# Patient Record
Sex: FEMALE | Race: WHITE | Employment: PART TIME | URBAN - METROPOLITAN AREA
[De-identification: names, ages, dates, MRNs, and addresses within clinical notes are randomized per-mention and may not be internally consistent; named-entity substitution may affect disease eponyms.]

---

## 2020-09-03 ENCOUNTER — OFFICE VISIT (OUTPATIENT)
Dept: OBGYN CLINIC | Age: 25
End: 2020-09-03
Payer: COMMERCIAL

## 2020-09-03 ENCOUNTER — HOSPITAL ENCOUNTER (OUTPATIENT)
Age: 25
Setting detail: SPECIMEN
Discharge: HOME | End: 2020-09-03
Payer: COMMERCIAL

## 2020-09-03 VITALS
BODY MASS INDEX: 19.25 KG/M2 | SYSTOLIC BLOOD PRESSURE: 90 MMHG | HEIGHT: 66 IN | DIASTOLIC BLOOD PRESSURE: 66 MMHG | WEIGHT: 119.8 LBS

## 2020-09-03 LAB
ABO/RH: NORMAL
ABSOLUTE EOS #: 0.07 K/UL (ref 0–0.44)
ABSOLUTE IMMATURE GRANULOCYTE: <0.03 K/UL (ref 0–0.3)
ABSOLUTE LYMPH #: 2.04 K/UL (ref 1.1–3.7)
ABSOLUTE MONO #: 0.53 K/UL (ref 0.1–1.2)
AMPHETAMINE SCREEN URINE: NEGATIVE
ANTIBODY SCREEN: NEGATIVE
BARBITURATE SCREEN URINE: NEGATIVE
BASOPHILS # BLD: 0 % (ref 0–2)
BASOPHILS ABSOLUTE: <0.03 K/UL (ref 0–0.2)
BENZODIAZEPINE SCREEN, URINE: NEGATIVE
BILIRUBIN URINE: NEGATIVE
BUPRENORPHINE URINE: NORMAL
C. TRACHOMATIS, EXTERNAL RESULT: NEGATIVE
CANNABINOID SCREEN URINE: NEGATIVE
COCAINE METABOLITE, URINE: NEGATIVE
COLOR: YELLOW
COMMENT UA: NORMAL
CONTROL: PRESENT
DIFFERENTIAL TYPE: ABNORMAL
DIRECT EXAM: ABNORMAL
EOSINOPHILS RELATIVE PERCENT: 1 % (ref 1–4)
GLUCOSE URINE: NEGATIVE
HCT VFR BLD CALC: 40.4 % (ref 36.3–47.1)
HEMOGLOBIN: 12.9 G/DL (ref 11.9–15.1)
HEPATITIS B SURFACE ANTIGEN: NONREACTIVE
HEPATITIS C ANTIBODY: NONREACTIVE
HIV AG/AB: NONREACTIVE
IMMATURE GRANULOCYTES: 0 %
KETONES, URINE: NEGATIVE
LEUKOCYTE ESTERASE, URINE: NEGATIVE
LYMPHOCYTES # BLD: 24 % (ref 24–43)
Lab: ABNORMAL
MCH RBC QN AUTO: 29.7 PG (ref 25.2–33.5)
MCHC RBC AUTO-ENTMCNC: 31.9 G/DL (ref 28.4–34.8)
MCV RBC AUTO: 92.9 FL (ref 82.6–102.9)
MDMA URINE: NORMAL
METHADONE SCREEN, URINE: NEGATIVE
METHAMPHETAMINE, URINE: NORMAL
MONOCYTES # BLD: 6 % (ref 3–12)
N. GONORRHOEAE, EXTERNAL RESULT: NEGATIVE
NITRITE, URINE: NEGATIVE
NRBC AUTOMATED: 0 PER 100 WBC
OPIATES, URINE: NEGATIVE
OXYCODONE SCREEN URINE: NEGATIVE
PDW BLD-RTO: 12 % (ref 11.8–14.4)
PH UA: 7.5 (ref 5–8)
PHENCYCLIDINE, URINE: NEGATIVE
PLATELET # BLD: 247 K/UL (ref 138–453)
PLATELET ESTIMATE: ABNORMAL
PMV BLD AUTO: 10.2 FL (ref 8.1–13.5)
PREGNANCY TEST URINE, POC: POSITIVE
PROPOXYPHENE, URINE: NORMAL
PROTEIN UA: NEGATIVE
RBC # BLD: 4.35 M/UL (ref 3.95–5.11)
RBC # BLD: ABNORMAL 10*6/UL
RUBV IGG SER QL: 267.1 IU/ML
SEG NEUTROPHILS: 69 % (ref 36–65)
SEGMENTED NEUTROPHILS ABSOLUTE COUNT: 5.79 K/UL (ref 1.5–8.1)
SPECIFIC GRAVITY UA: 1.02 (ref 1–1.03)
SPECIMEN DESCRIPTION: ABNORMAL
T. PALLIDUM, IGG: NONREACTIVE
TEST INFORMATION: NORMAL
TRICYCLIC ANTIDEPRESSANTS, UR: NORMAL
TURBIDITY: CLEAR
URINE HGB: NEGATIVE
UROBILINOGEN, URINE: NORMAL
WBC # BLD: 8.5 K/UL (ref 3.5–11.3)
WBC # BLD: ABNORMAL 10*3/UL

## 2020-09-03 PROCEDURE — 81025 URINE PREGNANCY TEST: CPT | Performed by: NURSE PRACTITIONER

## 2020-09-03 PROCEDURE — 99203 OFFICE O/P NEW LOW 30 MIN: CPT | Performed by: NURSE PRACTITIONER

## 2020-09-03 RX ORDER — VITAMIN A ACETATE, .BETA.-CAROTENE, ASCORBIC ACID, CHOLECALCIFEROL, .ALPHA.-TOCOPHEROL ACETATE, DL-, THIAMINE MONONITRATE, RIBOFLAVIN, NIACINAMIDE, PYRIDOXINE HYDROCHLORIDE, FOLIC ACID, CYANOCOBALAMIN, CALCIUM CARBONATE, FERROUS FUMARATE, ZINC OXIDE, AND CUPRIC OXIDE 2000; 2000; 120; 400; 22; 1.84; 3; 20; 10; 1; 12; 200; 27; 25; 2 [IU]/1; [IU]/1; MG/1; [IU]/1; MG/1; MG/1; MG/1; MG/1; MG/1; MG/1; UG/1; MG/1; MG/1; MG/1; MG/1
TABLET ORAL
COMMUNITY
Start: 2020-08-09

## 2020-09-03 ASSESSMENT — ENCOUNTER SYMPTOMS
SHORTNESS OF BREATH: 0
DIARRHEA: 0
CONSTIPATION: 0
COUGH: 0
BACK PAIN: 0
ABDOMINAL DISTENTION: 0
ABDOMINAL PAIN: 0

## 2020-09-03 NOTE — PROGRESS NOTES
Talita Munguia is a 30 y.o.  at 13w2d with Estimated Date of Delivery: 3/14/21 who presents for prenatal care. This is a planned pregnancy : No  Patient's last menstrual period was 2020. 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 Patientdoes intend to breast feed. Pregnancy history fully reviewed. Mother's ethnicity:   Father's ethnicity:          POB:   OB History    Para Term  AB Living   1             SAB TAB Ectopic Molar Multiple Live Births                    # Outcome Date GA Lbr Ramses/2nd Weight Sex Delivery Anes PTL Lv   1 Current              Complications from previous pregnancies/deliveries    PGYN: denies STDs; denies abnormal pap smears                      Menses regular yes  Contraception was on OCP's (was out for a week) took x 1 month    PMH:  has a past medical history of Anxiety. PSH:  has a past surgical history that includes [place] tooth extraction. FH:family history is not on file. SH: denies X 3, family supportive  reports that she has never smoked. She does not have any smokeless tobacco history on file. She reports current alcohol use. She reports that she does not use drugs. Review of Systems   Constitutional: Negative for appetite change and fatigue. HENT: Negative for congestion and hearing loss. Eyes: Negative for visual disturbance. Respiratory: Negative for cough and shortness of breath. Cardiovascular: Negative for chest pain and palpitations. Gastrointestinal: Negative for abdominal distention, abdominal pain, constipation and diarrhea. Genitourinary: Negative for flank pain, frequency, menstrual problem, pelvic pain and vaginal discharge. Musculoskeletal: Negative for back pain. Neurological: Negative for syncope and headaches. Psychiatric/Behavioral: Negative for behavioral problems. Physical exam:Ht 5' 6\" (1.676 m)   LMP 2020   Breastfeeding No   BMI 19.82 kg/m²   General Appearance: alert and oriented to person,place and time, well developed and well- nourished, in no acute distress  Skin: warm and dry, no rash or erythema  Head: normocephalic and atraumatic  Eyes: extraocular eye movements intact, conjunctivae normal  ENT:  external ear and ear canal normal bilaterally, nose without deformity, nasal mucosa normal   Neck: supple and non-tender without mass, no thyromegaly or thyroid nodules, no cervical lymphadenopathy  Pulmonary/Chest: clear to auscultation bilaterally- no wheezes, rales or rhonchi, normal air movement, no respiratory distress  Cardiovascular: normal rate, regular rhythm, normal S1 and S2, no murmurs, rubs, clicks, orgallops, distal pulses intact, no carotid bruits  Abdomen: soft, non-tender, non-distended, normal bowel sounds, no masses or organomegaly  Extremities: no cyanosis, clubbing or edema  Musculoskeletal: normal rangeof motion, no joint swelling, deformity or tenderness  Neurologic: reflexes normal and symmetric, no cranial nerve deficit, gait, coordination and speech normal  Breast: without skin retraction, dimpling, puckering, nipple discharge or masses. There is no axillary adenopathy      Pelvic: external genitalia WNL's, no rashes, no lesions. Speculum exam: vaginal vault pink, wellrugated, without lesions. No discharge. Cervix without lesions. Bimanual exam: no cervical motion tenderness. Impression: @ 12w4d by LMP             There is no problem list on file for this patient. Plan:    -Papand routine cultures to lab.   -Options for genetic testing : discussed- doesn't think she wants to do it.   -Return to clinic 2 weeks for Ultrasound and ACOG appointments.  -Prenatal vitamins          Orders Placed This Encounter   Procedures    C.trachomatis N.gonorrhoeae DNA    Culture, Urine    VAGINITIS DNA PROBE    US OB LESS THAN 14 WEEKS SINGLE OR FIRST GESTATION    US OB TRANSVAGINAL    CBC Auto Differential    Cystic fibrosis gene test    Hepatitis B Surface Antigen Obstetric Panel    Hepatitis C Antibody    HIV Screen    PAP SMEAR    Rubella antibody, IgG    T. pallidum Ab    Urinalysis    Urine Drug Screen    POCT urine pregnancy    Prenatal type and screen

## 2020-09-03 NOTE — PATIENT INSTRUCTIONS
Patient Education        Weeks 10 to 14 of Your Pregnancy: Care Instructions  Your Care Instructions     By weeks 10 to 14 of your pregnancy, the placenta has formed inside your uterus. It is possible to hear your baby's heartbeat with a special ultrasound device. Your baby's eyes can and do move. The arms and legs can bend. This is a good time to think about testing for birth defects. There are two types of tests: screening and diagnostic. Screening tests show the chance that a baby has a certain birth defect. They can't tell you for sure that your baby has a problem. Diagnostic tests show if a baby has a certain birth defect. It's your choice whether to have these tests. You and your partner can talk to your doctor or midwife about birth defects tests. Follow-up care is a key part of your treatment and safety. Be sure to make and go to all appointments, and call your doctor if you are having problems. It's also a good idea to know your test results and keep a list of the medicines you take. How can you care for yourself at home? Decide about tests  · You can have screening tests and diagnostic tests to check for birth defects. The decision to have a test for birth defects is personal. Think about your age, your chance of passing on a family disease, your need to know about any problems, and what you might do after you have the test results. ? Triple or quadruple (quad) blood tests. These screening tests can be done between 15 and 20 weeks of pregnancy. They check the amounts of three or four substances in your blood. The doctor looks at these test results, along with your age and other factors, to find out the chance that your baby may have certain problems. ? Amniocentesis. This diagnostic test is used to look for chromosomal problems in the baby's cells.  It can be done between 15 and 20 weeks of pregnancy, usually around week 16.  ? Nuchal translucency test. This test uses ultrasound to measure the thickness of the area at the back of the baby's neck. An increase in the thickness can be an early sign of Down syndrome. ? Chorionic villus sampling (CVS). This is a test that looks for certain genetic problems with your baby. The same genes that are in your baby are in the placenta. A small piece of the placenta is taken out and tested. This test is done when you are 10 to 13 weeks pregnant. Ease discomfort  · Slow down and take naps when you feel tired. · If your emotions swing, talk to someone. Crying, anxiety, and concentration problems are common. · If your gums bleed, try a softer toothbrush. If your gums are puffy and bleed a lot, see your dentist.  · If you feel dizzy:  ? Get up slowly after sitting or lying down. ? Drink plenty of fluids. ? Eat small snacks to keep your blood sugar stable. ? Put your head between your legs as though you were tying your shoelaces. ? Lie down with your legs higher than your head. Use pillows to prop up your feet. · If you have a headache:  ? Lie down. ? Ask your partner or a good friend for a neck massage. ? Try cool cloths over your forehead or across the back of your neck. ? Use acetaminophen (Tylenol) for pain relief. Do not use nonsteroidal anti-inflammatory drugs (NSAIDs), such as ibuprofen (Advil, Motrin) or naproxen (Aleve), unless your doctor says it is okay. · If you have a nosebleed, pinch your nose gently, and hold it for a short while. To prevent nosebleeds, try massaging a small dab of petroleum jelly, such as Vaseline, in your nostrils. · If your nose is stuffed up, try saline (saltwater) nose sprays. Do not use decongestant sprays. Care for your breasts  · Wear a bra that gives you good support. · Know that changes in your breasts are normal.  ? Your breasts may get larger and more tender. Tenderness usually gets better by 12 weeks. ? Your nipples may get darker and larger, and small bumps around your nipples may show more. ?  The veins in your chest and breasts may show more. · Don't worry about \"toughening'\" your nipples. Breastfeeding will naturally do this. Where can you learn more? Go to https://[facility].healthSpreedly. org and sign in to your [employer] account. Enter K945 in the [facility] box to learn more about \"Weeks 10 to 14 of Your Pregnancy: Care Instructions. \"     If you do not have an account, please click on the \"Sign Up Now\" link. Current as of: February 11, 2020               Content Version: 12.5  © 1178-6761 [employer]. Care instructions adapted under license by [facility] ([facility]). If you have questions about a medical condition or this instruction, always ask your healthcare professional. [place] [address] any warranty or liability for your use of this information. Patient Education        Learning About When to Call Your Doctor During Pregnancy (Up to 20 Weeks)  Your Care Instructions     It's common to have concerns about what might be a problem during pregnancy. Although most pregnant women don't have any serious problems, it's important to know when to call your doctor if you have certain symptoms. These are general suggestions. Your doctor may give you some more information about when to call. When to call your doctor (up to 20 weeks)  [id number] anytime you think you may need emergency care. For example, call if:  · You passed out (lost consciousness). Call your doctor now or seek immediate medical care if:  · You have a fever. · You have vaginal bleeding. · You are dizzy or lightheaded, or you feel like you may faint. · You have symptoms of a urinary tract infection. These may include:  ? Pain or burning when you urinate. ? A frequent need to urinate without being able to pass much urine. ? Pain in the flank, which is just below the rib cage and above the waist on either side of the back. ? Blood in your urine. · You have belly pain.   · You think you are having

## 2020-09-04 LAB
C TRACH DNA GENITAL QL NAA+PROBE: NEGATIVE
CULTURE: NO GROWTH
Lab: NORMAL
N. GONORRHOEAE DNA: NEGATIVE
SPECIMEN DESCRIPTION: NORMAL
SPECIMEN DESCRIPTION: NORMAL

## 2020-09-04 RX ORDER — METRONIDAZOLE 7.5 MG/G
1 GEL VAGINAL NIGHTLY
Qty: 1 TUBE | Refills: 0 | Status: SHIPPED | OUTPATIENT
Start: 2020-09-04 | End: 2020-09-11

## 2020-09-10 LAB — CYSTIC FIBROSIS: NORMAL

## 2020-09-14 LAB — CYTOLOGY REPORT: NORMAL

## 2020-09-18 ENCOUNTER — INITIAL PRENATAL (OUTPATIENT)
Dept: OBGYN CLINIC | Age: 25
End: 2020-09-18

## 2020-09-18 ENCOUNTER — PROCEDURE VISIT (OUTPATIENT)
Dept: OBGYN CLINIC | Age: 25
End: 2020-09-18
Payer: COMMERCIAL

## 2020-09-18 VITALS — SYSTOLIC BLOOD PRESSURE: 110 MMHG | DIASTOLIC BLOOD PRESSURE: 70 MMHG | WEIGHT: 120.4 LBS | BODY MASS INDEX: 19.43 KG/M2

## 2020-09-18 LAB
CRL: NORMAL
SAC DIAMETER: NORMAL

## 2020-09-18 PROCEDURE — 0502F SUBSEQUENT PRENATAL CARE: CPT | Performed by: NURSE PRACTITIONER

## 2020-09-18 PROCEDURE — 76801 OB US < 14 WKS SINGLE FETUS: CPT | Performed by: OBSTETRICS & GYNECOLOGY

## 2020-09-18 NOTE — PATIENT INSTRUCTIONS

## 2020-09-18 NOTE — PROGRESS NOTES
-LOF, -VB  There is no problem list on file for this patient. Blood pressure 110/70, weight 120 lb 6.4 oz (54.6 kg), last menstrual period 2020, not currently breastfeeding. Giacomo Ennis is a 30 y.o. , here for her ACOG. The patients past medical, surgical, social and family history were reviewed. Current medications and allergies were reviewed, and documented in the chart. Menstrual history: monthly  Birth control: none    Wt Readings from Last 3 Encounters:   20 120 lb 6.4 oz (54.6 kg)   20 119 lb 12.8 oz (54.3 kg)   17 122 lb 12.8 oz (55.7 kg)     Recent Results (from the past 8736 hour(s))   C. Trachomatis, External Result    Collection Time: 20 12:00 AM   Result Value Ref Range    C. Trachomatis, External Result NEGATIVE    N. Gonorrhoeae, External Result    Collection Time: 20 12:00 AM   Result Value Ref Range    N. Gonorrhoeae, External Result NEGATIVE    Cystic Fibrosis    Collection Time: 20 12:00 AM   Result Value Ref Range    Cystic Fibrosis       Specimen(s) Received:  Peripheral blood, CF  Clinical Information:  Prenatal screening for CF  Unknown family history of CF      RESULTS:  ** This patient is negative for all CF mutations analyzed **  Due to the complex nature of this testing, genetic counseling is  recommended  This interpretation is based on the assumption that this individual  has a negative personal and family history for cystic fibrosis    INTERPRETATION:    Using the methods described, this patient tested  negative for all 60 mutations screened, including those recommended by  the [employer] of Obstetricians and Gynecologists and the  [employer].   These results do not rule out  the possibility that this individual could carry a CF mutation not  detected by this test.  The patient should understand that DNA studies  do not constitute a definitive carrier test for cystic fibrosis in all  individuals. Thus, interpretation is given as a probability (see  below). Accurate risk calcula tion requires accurate family history  information. Inaccurate reporting of a family history of cystic  fibrosis will lead to errors in residual risk assessment. It should  be realized that there are many sources of diagnostic error in  molecular testing which may include trace contamination of PCR  reactions, rare genetic variants that can interfere with the analysis,  or general laboratory error. Cystic Fibrosis Carrier Rate by Racial and Ethnic Group, Before and  After Screening  [from Glenna et al (2001):  [place] in Med 3(2), 149-154]                                  Estimated Carrier Risk          Ethnic Group               Detection Rate                     Before Test          After Negative Test       Ashkenazi Mandaeism                    97%               1/29 1/930                       90%               1/29 1/240                      69%               1/65 1/207       * American                 57%               1/46 1/105   American                    No data available          1/90           No data available     *Additional information required to accurately predict risk  Note:  Residual carrier risk after negative test is modified by  presence of positive family history of CF or by admixture of ethnic  groups. For these situations, accurate risk assessment requires Bayesian  analysis and genetic counseling    This molecular test has been approved for in vitro diagnostic use by  the U.S. FDA. This test is used for clinical purposes. Pursuant to  the requirements of CLIA '88, this laboratory has established and  verified the test's accuracy and precision. It should not be regarded  as investigational or for research.   This laboratory is certified  under the Clinical Laboratory Improvement Amendments of 1988 (CLIA  '88) as qualified to perform high complexity clinical laboratory  testing. Methodology:  Samples are tested for the presence of  wild type or  mutant gene sequences in the CFTR gene by the [employer] Cystic  Fibrosis 60 Kit. It is comprised of a single multiplex PCR reaction  that is then used in two separate Allele Specific Primer Extension  reactions, which are followed by two separate bead hybridization  reactions. The [employer] Assay screens for 60 CFTR mutations,  including the standard 23 mutation core panel recommended by the  [employer] of Obstetricians and Gynecologists (ACOG) and the  [address] ([facility]):  , , G542X,  G85E, R117H, 621+1G>T, 711+1G>T, N9058M, R334W, R347P, A455E,  1717-1G>A, R560T, R553X, G551D, 1898+1G>A, 2184delA, 2789+5G>A,  3120+1G>A, H2312A, 3659delC, 3849+10kbC>T, N7292N, 1078delT, 394delTT,  Y122X, R347H, V520F, A559T, S549N, S549R, 1898+5G>T, 2183AA>G,  2307insA, D2261Z, J4071L, X2782U, 3876delA, 3905insT, CFTRdele2,3,  E60X, R75X, 406-1G>A, G178R, [id number], L206W, 935delA, G330X, Q493X,  Q890X, 1677delTA, 4017gje8>A, 2143delT, K710X,  V2910F, 5111rms8,  U3550K, [id number], D8353S, 3791delC and variants 5T/7T/9T, F508C, I507V,  I506V. For samples positive for R117H, the IVS-8 Poly T variant is  analyzed. **Electronically Signed Out**   Amor Villalta M.D.         [address], [address]   Tel [phone]  [address] for Maryam Angel MD,   Hillary Thompson MD     POCT urine pregnancy    Collection Time: 09/03/20  8:37 AM   Result Value Ref Range    Preg Test, Ur POSITIVE     Control present    Hepatitis B Surface Antigen Obstetric Panel    Collection Time: 09/03/20  8:55 AM   Result Value Ref Range    Hepatitis B Surface Ag NONREACTIVE NONREACTIVE   Hepatitis C Antibody    Collection Time: 09/03/20  8:55 AM   Result Value Ref Range    Hepatitis C Ab NONREACTIVE NONREACTIVE   HIV Screen    Collection Time: 09/03/20  8:55 AM   Result Value Ref Range    HIV Ag/Ab NONREACTIVE NONREACTIVE   PRENATAL TYPE AND SCREEN    Collection Time: 09/03/20  8:55 AM   Result Value Ref Range    ABO/Rh B POSITIVE     Antibody Screen NEGATIVE    Rubella antibody, IgG    Collection Time: 09/03/20  8:55 AM   Result Value Ref Range    Rubella Antibody, .1 IU/mL   T. pallidum Ab    Collection Time: 09/03/20  8:55 AM   Result Value Ref Range    T. pallidum, IgG NONREACTIVE NONREACTIVE   Urinalysis    Collection Time: 09/03/20  8:55 AM   Result Value Ref Range    Color, UA YELLOW YELLOW    Turbidity UA CLEAR CLEAR    Glucose, Ur NEGATIVE NEGATIVE    Bilirubin Urine NEGATIVE NEGATIVE    Ketones, Urine NEGATIVE NEGATIVE    Specific Gravity, UA 1.019 1.005 - 1.030    Urine Hgb NEGATIVE NEGATIVE    pH, UA 7.5 5.0 - 8.0    Protein, UA NEGATIVE NEGATIVE    Urobilinogen, Urine Normal Normal    Nitrite, Urine NEGATIVE NEGATIVE    Leukocyte Esterase, Urine NEGATIVE NEGATIVE    Urinalysis Comments       Microscopic exam not performed based on chemical results unless requested in original order.    Urine Drug Screen    Collection Time: 09/03/20  8:55 AM   Result Value Ref Range    Amphetamine Screen, Ur NEGATIVE NEGATIVE    Barbiturate Screen, Ur NEGATIVE NEGATIVE    Benzodiazepine Screen, Urine NEGATIVE NEGATIVE    Cocaine Metabolite, Urine NEGATIVE NEGATIVE    Methadone Screen, Urine NEGATIVE NEGATIVE    Opiates, Urine NEGATIVE NEGATIVE    Phencyclidine, Urine NEGATIVE NEGATIVE    Propoxyphene, Urine NOT REPORTED NEGATIVE    Cannabinoid Scrn, Ur NEGATIVE NEGATIVE    Oxycodone Screen, Ur NEGATIVE NEGATIVE    Methamphetamine, Urine NOT REPORTED NEGATIVE    Tricyclic Antidepressants, Urine NOT REPORTED NEGATIVE    MDMA, Urine NOT REPORTED NEGATIVE    Buprenorphine Urine NOT REPORTED NEGATIVE    Test Information       Assay provides medical screening only. The absence of expected drug(s) and/or metabolite(s) may indicate diluted or adulterated urine, limitations of testing or timing of collection. CBC Auto Differential    Collection Time: 09/03/20  8:55 AM   Result Value Ref Range    WBC 8.5 3.5 - 11.3 k/uL    RBC 4.35 3.95 - 5.11 m/uL    Hemoglobin 12.9 11.9 - 15.1 g/dL    Hematocrit 40.4 36.3 - 47.1 %    MCV 92.9 82.6 - 102.9 fL    MCH 29.7 25.2 - 33.5 pg    MCHC 31.9 28.4 - 34.8 g/dL    RDW 12.0 11.8 - 14.4 %    Platelets 519 292 - 725 k/uL    MPV 10.2 8.1 - 13.5 fL    NRBC Automated 0.0 0.0 per 100 WBC    Differential Type NOT REPORTED     Seg Neutrophils 69 (H) 36 - 65 %    Lymphocytes 24 24 - 43 %    Monocytes 6 3 - 12 %    Eosinophils % 1 1 - 4 %    Basophils 0 0 - 2 %    Immature Granulocytes 0 0 %    Segs Absolute 5.79 1.50 - 8.10 k/uL    Absolute Lymph # 2.04 1.10 - 3.70 k/uL    Absolute Mono # 0.53 0.10 - 1.20 k/uL    Absolute Eos # 0.07 0.00 - 0.44 k/uL    Basophils Absolute <0.03 0.00 - 0.20 k/uL    Absolute Immature Granulocyte <0.03 0.00 - 0.30 k/uL    WBC Morphology NOT REPORTED     RBC Morphology NOT REPORTED     Platelet Estimate NOT REPORTED    Culture, Urine    Collection Time: 09/03/20  9:03 AM    Specimen: Urine, clean catch   Result Value Ref Range    Specimen Description . CLEAN CATCH URINE     Special Requests NOT REPORTED     Culture NO GROWTH    GYN Cytology    Collection Time: 09/03/20  1:46 PM   Result Value Ref Range    Cytology Report       INTERPRETATION    Cervical material, (ThinPrep vial, Imaging-assisted review):  Specimen Adequacy:       Satisfactory for evaluation.       -Endocervical/transformation zone component is absent. Descriptive Diagnosis:       Negative for intraepithelial lesion or malignancy.           Cytotechnologist:   SHARIF Ojeda(ASCP)  **Electronically Signed Out**  luther/9/14/2020            Source:  1: Cervical material, (ThinPrep vial, Imaging-assisted review)    Clinical History  Pregnant  High risk HPV DNA testing is requested if the diagnosis is abnormal    GYNECOLOGIC CYTOLOGY REPORT    Patient Name: Maribell Weaver [facility] Rec: [id number]  Path Number: [id number]  [address] [place] [address], [facility] [address] [place], [address]  [phone]  Fax: [phone]     C.trachomatis N.gonorrhoeae DNA    Collection Time: 09/03/20  5:14 PM    Specimen: Cervix   Result Value Ref Range    Specimen Description . CERVIX     C. trachomatis DNA NEGATIVE NEGATIVE    N. gonorrhoeae DNA NEGATIVE NEGATIVE   VAGINITIS DNA PROBE    Collection Time: 09/03/20  5:14 PM    Specimen: Vaginal   Result Value Ref Range    Specimen Description . VAGINA     Special Requests NOT REPORTED     Direct Exam POSITIVE for Gardnerella vaginalis. (A)     Direct Exam NEGATIVE for Candida sp. Direct Exam NEGATIVE for Trichomonas vaginalis     Direct Exam       Method of testing is a DNA probe intended for detection and identification of Candida species, Gardnerella vaginalis, and Trichomonas vaginalis nucleic acid in vaginal fluid specimens from patients with symptoms of vaginitis/vaginosis.    US OB LESS THAN 14 WEEKS SINGLE OR FIRST GESTATION    Collection Time: 09/18/20 12:00 AM   Result Value Ref Range    CRL      Sac Diameter         Past Medical History:   Diagnosis Date    Anxiety                                                                    Past Surgical History:   Procedure Laterality Date    WISDOM TOOTH EXTRACTION       Family History   Problem Relation Age of Onset    No Known Problems Mother     No Known Problems Father     Heart Disease Maternal Grandmother     Stroke Maternal Grandfather     No Known Problems Paternal Grandmother     No Known Problems Paternal Grandfather      Social History     Tobacco Use   Smoking Status Never Smoker   Smokeless Tobacco Never Used     Social History     Substance and Sexual Activity   Alcohol Use Not Currently    Comment: socially       MEDICATIONS:  Current Outpatient Medications   Medication Sig Dispense Refill    Prenatal Vit-Fe Fumarate-FA (PNV PRENATAL PLUS MULTIVITAMIN) 27-1 MG TABS TAKE 1 TABLET BY MOUTH ONE TIME A DAY      Doxylamine Succinate, Sleep, (UNISOM PO) Take by mouth      Pyridoxine HCl (VITAMIN B-6 PO) Take by mouth       No current facility-administered medications for this visit. ALLERGIES:  Patient has no known allergies. Reviewed global and practice OB care including nausea measures, nutrition, activities, warning signs, and contact information.  Offered cell free DNA screen,NT echo and WIC .    `--------------------------------------------------------------------------  Genetic Screening/Teratology Counseling  (Include patient, FOB or anyone in either family)    1) Patient's age 28 years or > at JANET: No  2) Thalassemia (Mediterranean, ): No  3) Neural Tube Defect:   No  4) Congenital heart defect:   No  5) Trisomy (e.g. Down Syndrome):  No  6) Ubaldo-sachs (Ev[employer], [facility]): No  7) Multiple Births:    No  8) Sickle cell (disease or trait):  No  9) Hemophilia or blood disorders:  No  10) Muscular Dystrophy:   No  11) Cystic Fibrosis:    No  12) Jose Ramon's chorea:   No  13) Mental retardation/Autism :  No   If yes, was person tested for fragile X: NA  14) Other inherited genetic/chromosomal disorder: No  15) Maternal metabolic disorder (DM, PKU): No  16) Child with birth defect not listed:  No  17) Recurrent pregnancy loss/stillbirth: No  18) Medications, supplements/illicit or   Recreational drugs/alcohol since LMP: No   List: Alcohol prior to positive pregnancy  19) Any other:   none    Comments/Counseling:   -------------------------------------------------------------------------  Infection History:    1) Live with someone with TB/exposed to TB: No  2) Patient/partner has h/o genital herpes: No  3) Rash/viral illness since LMP:  No  4) History of STD:    No  5) Other: NA  -------------------------------------------------------------------------

## 2020-10-16 ENCOUNTER — ROUTINE PRENATAL (OUTPATIENT)
Dept: OBGYN CLINIC | Age: 25
End: 2020-10-16

## 2020-10-16 ENCOUNTER — HOSPITAL ENCOUNTER (OUTPATIENT)
Age: 25
Setting detail: SPECIMEN
Discharge: HOME | End: 2020-10-16
Payer: COMMERCIAL

## 2020-10-16 VITALS — SYSTOLIC BLOOD PRESSURE: 94 MMHG | BODY MASS INDEX: 20.14 KG/M2 | WEIGHT: 124.8 LBS | DIASTOLIC BLOOD PRESSURE: 66 MMHG

## 2020-10-16 PROCEDURE — 0502F SUBSEQUENT PRENATAL CARE: CPT | Performed by: NURSE PRACTITIONER

## 2020-10-16 NOTE — PATIENT INSTRUCTIONS
Patient Education        Weeks 18 to 22 of Your Pregnancy: Care Instructions  Your Care Instructions     Your baby is continuing to develop quickly. At this stage, babies can now suck their thumbs,  firmly with their hands, and open and close their eyelids. Sometime between 18 and 22 weeks, you will start to feel your baby move. At first, these small fetal movements feel like fluttering or \"butterflies. \" Some women say that they feel like gas bubbles. As the baby grows, these movements will become stronger. You may also notice that your baby kicks and hiccups. During this time, you may find that your nausea and fatigue are gone. Overall, you may feel better and have more energy than you did in your first trimester. But you may also have new discomforts now, such as sleep problems or leg cramps. This care sheet can help you ease these discomforts. Follow-up care is a key part of your treatment and safety. Be sure to make and go to all appointments, and call your doctor if you are having problems. It's also a good idea to know your test results and keep a list of the medicines you take. How can you care for yourself at home? Ease sleep problems  · Avoid caffeine in drinks or chocolate late in the day. · Get some exercise every day. · Take a warm shower or bath before bed. · Have a light snack or glass of milk at bedtime. · Do relaxation exercises in bed to calm your mind and body. · Support your legs and back with extra pillows. Try a pillow between your legs if you sleep on your side. · Do not use sleeping pills or alcohol. They could harm your baby. Ease leg cramps  · Do not massage your calf during the cramp. · Sit on a firm bed or chair. Straighten your leg, and bend your foot (flex your ankle) slowly upward, toward your knee. Bend your toes up and down. · Stand on a cool, flat surface. Stretch your toes upward, and take small steps walking on your heels.   · Use a heating pad or hot water bottle to help with muscle ache. Prevent leg cramps  · Be sure to get enough calcium. If you are worried that you are not getting enough, talk to your doctor. · Exercise every day, and stretch your legs before bed. · Take a warm bath before bed, and try leg warmers at night. Where can you learn more? Go to https://chpehowardewmirlande.healthVIEO. org and sign in to your [employer] account. Enter Z453 in the [employer] box to learn more about \"Weeks 18 to 22 of Your Pregnancy: Care Instructions. \"     If you do not have an account, please click on the \"Sign Up Now\" link. Current as of: February 11, 2020               Content Version: 12.6  © 2006-2020 [employer]. Care instructions adapted under license by [facility] ([facility]). If you have questions about a medical condition or this instruction, always ask your healthcare professional. [place] [address] any warranty or liability for your use of this information. Patient Education        Learning About When to Call Your Doctor During Pregnancy (Up to 20 Weeks)  Your Care Instructions     It's common to have concerns about what might be a problem during pregnancy. Although most pregnant women don't have any serious problems, it's important to know when to call your doctor if you have certain symptoms. These are general suggestions. Your doctor may give you some more information about when to call. When to call your doctor (up to 20 weeks)  Call 911 anytime you think you may need emergency care. For example, call if:  · You passed out (lost consciousness). Call your doctor now or seek immediate medical care if:  · You have a fever. · You have vaginal bleeding. · You are dizzy or lightheaded, or you feel like you may faint. · You have symptoms of a urinary tract infection. These may include:  ? Pain or burning when you urinate. ? A frequent need to urinate without being able to pass much urine.   ? Pain in the flank, which is just below the rib cage and above the waist on either side of the back. ? Blood in your urine. · You have belly pain. · You think you are having contractions. · You have a sudden release of fluid from your vagina. Watch closely for changes in your health, and be sure to contact your doctor if:  · You have vaginal discharge that smells bad. · You have other concerns about your pregnancy. Follow-up care is a key part of your treatment and safety. Be sure to make and go to all appointments, and call your doctor if you are having problems. It's also a good idea to know your test results and keep a list of the medicines you take. Where can you learn more? Go to https://[employer] Radareweb.healthJedox AG. org and sign in to your [employer] account. Enter L337 in the [employer] box to learn more about \"Learning About When to Call Your Doctor During Pregnancy (Up to 20 Weeks). \"     If you do not have an account, please click on the \"Sign Up Now\" link. Current as of: February 11, 2020               Content Version: 12.6  © 9385-9030 [employer]. Care instructions adapted under license by [facility] ([facility]). If you have questions about a medical condition or this instruction, always ask your healthcare professional. [address] any warranty or liability for your use of this information.

## 2020-10-16 NOTE — PROGRESS NOTES
-FM yet, -Ctx, -LOF, -VB  There is no problem list on file for this patient. Blood pressure 94/66, weight 124 lb 12.8 oz (56.6 kg), last menstrual period 06/07/2020, not currently breastfeeding.   Feeling well  Anatomy scan ordered  Not feeling FM  S/p flu shot  Quad screen ordered

## 2020-10-17 LAB
AFP INTERPRETATION: NORMAL
AFP MOM: 0.97
AFP QUAD INTERPRETATION: NORMAL
AFP SPECIMEN: NORMAL
AFP: 52 NG/ML
DATE OF BIRTH: NORMAL
DATING METHOD: NORMAL
DETERMINED BY: NORMAL
DIABETIC: NEGATIVE
DIMERIC INHIBIN A: 99 PG/ML
DUE DATE: NORMAL
ESTIMATED DUE DATE: NORMAL
FAMILY HISTORY NTD: NEGATIVE
GESTATIONAL AGE: NORMAL
HISTORY OF ANEUPLOIDY?: NO
IN VITRO FERTILIZATION: NORMAL
INHIBIN A MOM: 0.57
INSULIN REQ DIABETES: NO
LAST MENSTRUAL PERIOD: NORMAL
MATERNAL AGE AT EDD: 28.2 YR
MATERNAL WEIGHT: 124
MOM FOR HCG, 2ND TRIMESTER: 1.93
MONOCHORIONIC TWINS: NORMAL
NUMBER OF FETUSES: NORMAL
PATIENT WEIGHT UNITS: NORMAL
PATIENT WEIGHT: NORMAL
PATIENT'S HCG, TRI 2: NORMAL IU/L
PREV TRISOMY PREG: NORMAL
RACE (MATERNAL): NORMAL
RACE: NORMAL
REPEAT SPECIMEN?: NORMAL
SMOKING: NORMAL
SMOKING: NORMAL
UE3 MOM: 0.6
UE3 VALUE: 1.18 NG/ML
VALPROIC/CARBAMAZEP: NORMAL
ZZ NTE CLEAN UP: HISTORY: NO

## 2020-10-23 ENCOUNTER — PROCEDURE VISIT (OUTPATIENT)
Dept: OBGYN CLINIC | Age: 25
End: 2020-10-23
Payer: COMMERCIAL

## 2020-10-23 LAB
ABDOMINAL CIRCUMFERENCE: NORMAL
ABDOMINAL CIRCUMFERENCE: NORMAL
BIPARIETAL DIAMETER: NORMAL
BIPARIETAL DIAMETER: NORMAL
ESTIMATED FETAL WEIGHT: NORMAL
ESTIMATED FETAL WEIGHT: NORMAL
FEMORAL DIAMETER: NORMAL
FEMORAL DIAMETER: NORMAL
HC/AC: NORMAL
HC/AC: NORMAL
HEAD CIRCUMFERENCE: NORMAL
HEAD CIRCUMFERENCE: NORMAL

## 2020-10-23 PROCEDURE — 76805 OB US >/= 14 WKS SNGL FETUS: CPT | Performed by: OBSTETRICS & GYNECOLOGY

## 2020-10-23 PROCEDURE — 76817 TRANSVAGINAL US OBSTETRIC: CPT | Performed by: OBSTETRICS & GYNECOLOGY

## 2020-10-26 ENCOUNTER — TELEPHONE (OUTPATIENT)
Dept: OBGYN CLINIC | Age: 25
End: 2020-10-26

## 2020-10-26 NOTE — TELEPHONE ENCOUNTER
----- Message from Tomasa Echeverria MD sent at 10/26/2020 11:16 AM EDT -----  Regarding: Anatomy US  Head circumference and abdominal circumference are both < 10%ile on anatomy US. I don't see that she's gotten any genetic testing. Should be referred to [facility] for counseling and cell free DNA.

## 2020-10-27 ENCOUNTER — TELEPHONE (OUTPATIENT)
Dept: OBGYN CLINIC | Age: 25
End: 2020-10-27

## 2020-10-27 NOTE — TELEPHONE ENCOUNTER
OB 20.2wk Pt notified of fetal head and abd circ just below 10% growth for AGA. Referral being place for MFM options for free cell dna screening and additional imaging for growth/Anatomy scan. Informed pt that if they have additional questions, please write them down and if she would like to speak with provider. Pt verbalizes understanding.

## 2020-11-10 ENCOUNTER — HOSPITAL ENCOUNTER (OUTPATIENT)
Age: 25
Discharge: HOME | End: 2020-11-10
Payer: COMMERCIAL

## 2020-11-10 ENCOUNTER — ROUTINE PRENATAL (OUTPATIENT)
Dept: PERINATAL CARE | Age: 25
End: 2020-11-10
Payer: COMMERCIAL

## 2020-11-10 VITALS
HEIGHT: 66 IN | HEART RATE: 76 BPM | TEMPERATURE: 97.3 F | DIASTOLIC BLOOD PRESSURE: 60 MMHG | RESPIRATION RATE: 16 BRPM | WEIGHT: 131 LBS | BODY MASS INDEX: 21.05 KG/M2 | SYSTOLIC BLOOD PRESSURE: 94 MMHG

## 2020-11-10 LAB
TOXOPLASM IGM: 0.22 INDEX
TOXOPLASMA BLOOD FOR RATIO: <0.5 IU/ML

## 2020-11-10 PROCEDURE — 99243 OFF/OP CNSLTJ NEW/EST LOW 30: CPT | Performed by: OBSTETRICS & GYNECOLOGY

## 2020-11-10 PROCEDURE — 86777 TOXOPLASMA ANTIBODY: CPT

## 2020-11-10 PROCEDURE — 86645 CMV ANTIBODY IGM: CPT

## 2020-11-10 PROCEDURE — 86644 CMV ANTIBODY: CPT

## 2020-11-10 PROCEDURE — 36415 COLL VENOUS BLD VENIPUNCTURE: CPT

## 2020-11-10 PROCEDURE — 76811 OB US DETAILED SNGL FETUS: CPT | Performed by: OBSTETRICS & GYNECOLOGY

## 2020-11-10 PROCEDURE — 86778 TOXOPLASMA ANTIBODY IGM: CPT

## 2020-11-10 PROCEDURE — 86747 PARVOVIRUS ANTIBODY: CPT

## 2020-11-10 PROCEDURE — 76817 TRANSVAGINAL US OBSTETRIC: CPT | Performed by: OBSTETRICS & GYNECOLOGY

## 2020-11-10 PROCEDURE — 76821 MIDDLE CEREBRAL ARTERY ECHO: CPT | Performed by: OBSTETRICS & GYNECOLOGY

## 2020-11-11 LAB
CMV IGM: 0.6
CYTOMEGALOVIRUS IGG ANTIBODY: 0.2
SEND OUT REPORT: NORMAL
TEST NAME: NORMAL

## 2020-11-12 LAB
PARVOVIRUS B19 IGG ANTIBODY: 3.69 IV
PARVOVIRUS B19 IGM ANTIBODY: 0.64 IV

## 2020-11-13 ENCOUNTER — ROUTINE PRENATAL (OUTPATIENT)
Dept: OBGYN CLINIC | Age: 25
End: 2020-11-13

## 2020-11-13 VITALS
DIASTOLIC BLOOD PRESSURE: 66 MMHG | BODY MASS INDEX: 20.99 KG/M2 | SYSTOLIC BLOOD PRESSURE: 106 MMHG | HEIGHT: 66 IN | WEIGHT: 130.6 LBS | TEMPERATURE: 98.5 F

## 2020-11-13 PROBLEM — O35.DXX0 ECHOGENIC FOCUS OF BOWEL, FETAL, AFFECTING CARE OF MOTHER, ANTEPARTUM: Status: ACTIVE | Noted: 2020-11-13

## 2020-11-13 PROCEDURE — 0502F SUBSEQUENT PRENATAL CARE: CPT | Performed by: STUDENT IN AN ORGANIZED HEALTH CARE EDUCATION/TRAINING PROGRAM

## 2020-11-13 NOTE — PROGRESS NOTES
Prenatal Visit    Adrianna Lee is a 30 y.o. female  at 24w4d    The patient was seen and evaluated. Reports positive fetal movements. She denies headache, vision changes, RUQ pain, contractions, vaginal bleeding and leakage of fluid. The patient already received the influenza vaccine this year. The problem list reflects the active issues addressed during today's visit    Vitals:    BP: 106/66  Weight: 130 lb 9.6 oz (59.2 kg)  Fundal Height (cm): 22 cm  Fetal Heart Rate: 156  Movement: Present     Labs: The patient is RH +, Rhogam not indicated  ABO/Rh   Date Value Ref Range Status   2020 B POSITIVE  Final         Assessment & Plan:  Adrianna Lee is a 30 y.o. female  at 24w4d   - A Quad screen was reviewed and found to be normal.    - The patients anatomy ultrasound has been completed and reviewed with patient.    - Next [facility] appointment 20 for growth, dopplers, TVUS   -  labor and kick count precautions given. - Signs and symptoms of preeclampsia reviewed. Patient Active Problem List    Diagnosis Date Noted    Echogenic focus of bowel, fetal, affecting care of mother, antepartum 2020     NIPT pending      Irritable bowel syndrome 2019     Return in about 4 weeks (around 2020) for ISRA. The patient was instructed on fetal kick counts and was given a kick sheet to complete every 8 hours. This is to begin at 28 weeks gestation. She was instructed that the baby should move at a minimum of ten times within one hour after a meal. The patient was instructed to lay down on her left side twenty minutes after eating and count movements for up to one hour with a target value of ten movements. She was instructed to notify the office if she did not make that target after two attempts or if after any attempt there was less than four movements.     Merlin Major, [address] [place] Ob/Gyn   2020, 8:18 AM

## 2020-11-16 ENCOUNTER — TELEPHONE (OUTPATIENT)
Dept: OBGYN CLINIC | Age: 25
End: 2020-11-16

## 2020-11-16 ENCOUNTER — HOSPITAL ENCOUNTER (OUTPATIENT)
Age: 25
Setting detail: SPECIMEN
Discharge: HOME | End: 2020-11-16
Payer: COMMERCIAL

## 2020-11-16 ENCOUNTER — OFFICE VISIT (OUTPATIENT)
Dept: FAMILY MEDICINE CLINIC | Age: 25
End: 2020-11-16
Payer: COMMERCIAL

## 2020-11-16 VITALS
RESPIRATION RATE: 16 BRPM | TEMPERATURE: 98.2 F | OXYGEN SATURATION: 98 % | HEIGHT: 66 IN | HEART RATE: 67 BPM | BODY MASS INDEX: 20.89 KG/M2 | WEIGHT: 130 LBS

## 2020-11-16 PROCEDURE — 99202 OFFICE O/P NEW SF 15 MIN: CPT | Performed by: NURSE PRACTITIONER

## 2020-11-16 ASSESSMENT — PATIENT HEALTH QUESTIONNAIRE - PHQ9
1. LITTLE INTEREST OR PLEASURE IN DOING THINGS: 0
SUM OF ALL RESPONSES TO PHQ QUESTIONS 1-9: 0
SUM OF ALL RESPONSES TO PHQ9 QUESTIONS 1 & 2: 0
2. FEELING DOWN, DEPRESSED OR HOPELESS: 0
SUM OF ALL RESPONSES TO PHQ QUESTIONS 1-9: 0
SUM OF ALL RESPONSES TO PHQ QUESTIONS 1-9: 0

## 2020-11-16 ASSESSMENT — ENCOUNTER SYMPTOMS
EYE REDNESS: 0
CHEST TIGHTNESS: 0
SINUS PRESSURE: 0
WHEEZING: 0
VOICE CHANGE: 0
SORE THROAT: 0
EYE DISCHARGE: 0
SHORTNESS OF BREATH: 0
COUGH: 1

## 2020-11-16 NOTE — TELEPHONE ENCOUNTER
OB 22.5wk    S/S:  -started cold like symptoms  -nasal congestion  -no cough  -headaches/Sinus pressure  -no fever but feels hot      Tx/Meds:  -eating/ drinking fluids    Other findings:  -Pt states she just moved into new house, has no thermometer  -No PCP      -Tylenol cold and flu  -Humidify with Vicks

## 2020-11-16 NOTE — TELEPHONE ENCOUNTER
Called pt back informed her that  advises she go to ER [facility]. Pt would prefer no going to hospital setting if at all possible. Pt has decided to go to [facility]. Informed pt that  just has concerns she may need to be started on Oxygen. Pt agreeable to referral to establish care with physicians in [facility]. Referral sent.

## 2020-11-16 NOTE — PROGRESS NOTES
[address] [facility]  [address] [facility] [address] [address]  Dept: [phone]  Dept Fax: [phone]     Aren Traore is a 30 y.o. female who presents to the urgent care today for her medicalconditions/complaints as noted below. Aren Traore is c/o of Sinusitis; Headache; and Chest Congestion    HPI:      Sinusitis   This is a new problem. The current episode started in the past 7 days. The problem is unchanged. There has been no fever. Associated symptoms include congestion, coughing and headaches. Pertinent negatives include no chills, ear pain, shortness of breath, sinus pressure, sneezing or sore throat. Past treatments include nothing. The treatment provided no relief. Past Medical History:   Diagnosis Date    Abnormal Pap smear of cervix     Anxiety     Mental disorder     DELORES      Current Outpatient Medications   Medication Sig Dispense Refill    Prenatal Vit-Fe Fumarate-FA (PNV PRENATAL PLUS MULTIVITAMIN) 27-1 MG TABS TAKE 1 TABLET BY MOUTH ONE TIME A DAY      Doxylamine Succinate, Sleep, (UNISOM PO) Take by mouth      Pyridoxine HCl (VITAMIN B-6 PO) Take by mouth       No current facility-administered medications for this visit. No Known Allergies    Reviewed PMH, SH, and FH with the patient and updated. Subjective:      Review of Systems   Constitutional: Negative for chills, fatigue and fever. HENT: Positive for congestion. Negative for ear discharge, ear pain, postnasal drip, sinus pressure, sneezing, sore throat and voice change. Eyes: Negative for discharge and redness. Respiratory: Positive for cough. Negative for chest tightness, shortness of breath and wheezing. Cardiovascular: Negative. Negative for chest pain. Musculoskeletal: Negative for myalgias. Skin: Negative for rash. Neurological: Positive for headaches. Negative for dizziness, weakness and light-headedness.    Hematological: Negative for adenopathy. All other systems reviewed and are negative. Objective:      Physical Exam  Vitals signs and nursing note reviewed. Constitutional:       General: She is not in acute distress. Appearance: Normal appearance. She is well-developed. She is not ill-appearing, toxic-appearing or diaphoretic. HENT:      Head: Normocephalic. Right Ear: Tympanic membrane and external ear normal.      Left Ear: Tympanic membrane and external ear normal.      Nose: Congestion present. Right Sinus: No maxillary sinus tenderness or frontal sinus tenderness. Left Sinus: No maxillary sinus tenderness or frontal sinus tenderness. Mouth/Throat:      Pharynx: No oropharyngeal exudate or posterior oropharyngeal erythema. Eyes:      General:         Right eye: No discharge. Left eye: No discharge. Cardiovascular:      Rate and Rhythm: Normal rate and regular rhythm. Heart sounds: Normal heart sounds. No murmur. Pulmonary:      Effort: Pulmonary effort is normal. No respiratory distress. Breath sounds: Normal breath sounds. No wheezing or rales. Lymphadenopathy:      Cervical: No cervical adenopathy. Skin:     General: Skin is warm. Findings: No rash. Neurological:      Mental Status: She is alert. Pulse 67   Temp 98.2 °F (36.8 °C) (Temporal)   Resp 16   Ht 5' 6\" (1.676 m)   Wt 130 lb (59 kg)   LMP 06/07/2020   SpO2 98%   BMI 20.98 kg/m²     Assessment:       Diagnosis Orders   1. Congestion of nasal sinus  COVID-19 Ambulatory     Plan: Will send out COVID19 testing. Possible treatment alterations based on the results. Patient instructed to self-quarantine until testing results are back. Patient instructed not to return to work until results are back. Tylenol as needed for fever/pain. Encouraged adequate hydration and rest.  The patient indicates understanding of these issues and agrees with the plan.   Educational materials provided on AVS.  Follow up if symptoms do not improve/worsen. Discussed symptoms that will warrant urgent ED evaluation/treatment. Patient given educational materials - see patient instructions. Discussed use, benefit, and side effects of prescribed medications. All patientquestions answered. Pt voiced understanding.     Electronically signed by BERNA Wolff CNP on 11/16/2020at 1:18 PM

## 2020-11-16 NOTE — TELEPHONE ENCOUNTER
[facility] Sent message via mDialog with dates and times.

## 2020-11-20 LAB — SARS-COV-2, NAA: NOT DETECTED

## 2020-12-18 ENCOUNTER — ROUTINE PRENATAL (OUTPATIENT)
Dept: OBGYN CLINIC | Age: 25
End: 2020-12-18

## 2020-12-18 ENCOUNTER — ROUTINE PRENATAL (OUTPATIENT)
Dept: PERINATAL CARE | Age: 25
End: 2020-12-18
Payer: COMMERCIAL

## 2020-12-18 VITALS
SYSTOLIC BLOOD PRESSURE: 112 MMHG | BODY MASS INDEX: 23.02 KG/M2 | DIASTOLIC BLOOD PRESSURE: 78 MMHG | TEMPERATURE: 98.3 F | WEIGHT: 142.6 LBS

## 2020-12-18 VITALS
HEART RATE: 85 BPM | WEIGHT: 142.5 LBS | DIASTOLIC BLOOD PRESSURE: 64 MMHG | SYSTOLIC BLOOD PRESSURE: 101 MMHG | HEIGHT: 66 IN | BODY MASS INDEX: 22.9 KG/M2

## 2020-12-18 LAB
ABDOMINAL CIRCUMFERENCE: NORMAL
BIPARIETAL DIAMETER: NORMAL
ESTIMATED FETAL WEIGHT: NORMAL
FEMORAL DIAMETER: NORMAL
HC/AC: NORMAL
HEAD CIRCUMFERENCE: NORMAL

## 2020-12-18 PROCEDURE — 76821 MIDDLE CEREBRAL ARTERY ECHO: CPT | Performed by: OBSTETRICS & GYNECOLOGY

## 2020-12-18 PROCEDURE — 76820 UMBILICAL ARTERY ECHO: CPT | Performed by: OBSTETRICS & GYNECOLOGY

## 2020-12-18 PROCEDURE — 0502F SUBSEQUENT PRENATAL CARE: CPT | Performed by: NURSE PRACTITIONER

## 2020-12-18 PROCEDURE — 76816 OB US FOLLOW-UP PER FETUS: CPT | Performed by: OBSTETRICS & GYNECOLOGY

## 2020-12-18 PROCEDURE — 76817 TRANSVAGINAL US OBSTETRIC: CPT | Performed by: OBSTETRICS & GYNECOLOGY

## 2020-12-18 NOTE — PATIENT INSTRUCTIONS
Patient Education        Weeks 26 to 30 of Your Pregnancy: Care Instructions  Your Care Instructions     You are now in your last trimester of pregnancy. Your baby is growing rapidly. And you'll probably feel your baby moving around more often. Your doctor may ask you to count your baby's kicks. Your back may ache as your body gets used to your baby's size and length. If you haven't already had the Tdap shot during this pregnancy, talk to your doctor about getting it. It will help protect your  against pertussis infection. During this time, it's important to take care of yourself and pay attention to what your body needs. If you feel sexual, explore ways to be close with your partner that match your comfort and desire. Use the tips provided in this care sheet to find ways to be sexual in your own way. Follow-up care is a key part of your treatment and safety. Be sure to make and go to all appointments, and call your doctor if you are having problems. It's also a good idea to know your test results and keep a list of the medicines you take. How can you care for yourself at home? Take it easy at work  · Take frequent breaks. If possible, stop working when you are tired, and rest during your lunch hour. · Take bathroom breaks every 2 hours. · Change positions often. If you sit for long periods, stand up and walk around. · When you stand for a long time, keep one foot on a low stool with your knee bent. After standing a lot, sit with your feet up. · Avoid fumes, chemicals, and tobacco smoke. Be sexual in your own way  · Having sex during pregnancy is okay, unless your doctor tells you not to. · You may be very interested in sex, or you may have no interest at all. · Your growing belly can make it hard to find a good position during intercourse. [place] and explore. · You may get cramps in your uterus when your partner touches your breasts.   · A back rub may relieve the backache or cramps that sometimes follow orgasm. Learn about  labor  · Watch for signs of  labor. You may be going into labor if:  ? You have menstrual-like cramps, with or without nausea. ? You have about 6 or more contractions in 1 hour, even after you have had a glass of water and are resting. ? You have a low, dull backache that does not go away when you change your position. ? You have pain or pressure in your pelvis that comes and goes in a pattern. ? You have intestinal cramping or flu-like symptoms, with or without diarrhea.  ? You notice an increase or change in your vaginal discharge. Discharge may be heavy, mucus-like, watery, or streaked with blood. ? Your water breaks. · If you think you have  labor:  ? Drink 2 or 3 glasses of water or juice. Not drinking enough fluids can cause contractions. ? Stop what you are doing, and empty your bladder. Then lie down on your left side for at least 1 hour. ? While lying on your side, find your breast bone. Put your fingers in the soft spot just below it. Move your fingers down toward your belly button to find the top of your uterus. Check to see if it is tight. ? Contractions can be weak or strong. Record your contractions for an hour. Time a contraction from the start of one contraction to the start of the next one.  ? Single or several strong contractions without a pattern are called Hayden-Bowman contractions. They are practice contractions but not the start of labor. They often stop if you change what you are doing. ? Call your doctor if you have regular contractions. Where can you learn more? Go to https://[email or web address].C$ [email or web address]. org and sign in to your [email or web address] account. Enter Y623 in the [facility] box to learn more about \"Weeks 26 to 30 of Your Pregnancy: Care Instructions. \"     If you do not have an account, please click on the \"Sign Up Now\" link.   Current as of: 2020               Content Version: 12.6  © 6206-9972 Healthwise, Incorporated. Care instructions adapted under license by [facility] ([facility]). If you have questions about a medical condition or this instruction, always ask your healthcare professional. [address] any warranty or liability for your use of this information. Patient Education        Counting Your Baby's Kicks: Care Instructions  Your Care Instructions     Counting your baby's kicks is one way your doctor can tell that your baby is healthy. Most women--especially in a first pregnancy--feel their baby move for the first time between 16 and 22 weeks. The movement may feel like flutters rather than kicks. Your baby may move more at certain times of the day. When you are active, you may notice less kicking than when you are resting. At your prenatal visits, your doctor will ask whether the baby is active. In your last trimester, your doctor may ask you to count the number of times you feel your baby move. Follow-up care is a key part of your treatment and safety. Be sure to make and go to all appointments, and call your doctor if you are having problems. It's also a good idea to know your test results and keep a list of the medicines you take. How do you count fetal kicks? · A common method of checking your baby's movement is to count the number of kicks or moves you feel in 1 hour. Ten movements (such as kicks, flutters, or rolls) in 1 hour are normal. Some doctors suggest that you count in the morning until you get to 10 movements. Then you can quit for that day and start again the next day. · Pick your baby's most active time of day to count. This may be any time from morning to evening. · If you do not feel 10 movements in an hour, your baby may be sleeping. Wait for the next hour and count again. When should you call for help?    Call your doctor now or seek immediate medical care if:    · You noticed that your baby has stopped moving or is moving much less than normal. Watch closely for changes in your health, and be sure to contact your doctor if you have any problems. Where can you learn more? Go to [email or web address] and sign in to your [employer] account. Enter W889 in the MyStore.com box to learn more about \"Counting Your Baby's Kicks: Care Instructions. \"     If you do not have an account, please click on the \"Sign Up Now\" link. Current as of: February 11, 2020               Content Version: 12.6  © 2393-2832 [employer]. Care instructions adapted under license by [facility] [place] ([facility]). If you have questions about a medical condition or this instruction, always ask your healthcare professional. [address] any warranty or liability for your use of this information. Patient Education        Learning About When to Call Your Doctor During Pregnancy (After 20 Weeks)  Your Care Instructions  It's common to have concerns about what might be a problem during pregnancy. Although most pregnant women don't have any serious problems, it's important to know when to call your doctor if you have certain symptoms or signs of labor. These are general suggestions. Your doctor may give you some more information about when to call. When to call your doctor (after 20 weeks)  Call 911 anytime you think you may need emergency care. For example, call if:  · You have severe vaginal bleeding. · You have sudden, severe pain in your belly. · You passed out (lost consciousness). · You have a seizure. · You see or feel the umbilical cord. · You think you are about to deliver your baby and can't make it safely to the hospital.  Call your doctor now or seek immediate medical care if:  · You have vaginal bleeding. · You have belly pain. · You have a fever. · You have symptoms of preeclampsia, such as:  ? Sudden swelling of your face, hands, or feet. ? New vision problems (such as dimness, blurring, or seeing spots). ?  A questions about a medical condition or this instruction, always ask your healthcare professional. [address] any warranty or liability for your use of this information. Patient Education        Tdap (Tetanus, Diphtheria, Pertussis) Vaccine: What You Need to Know  Why get vaccinated? Tdap vaccine can prevent tetanus, diphtheria, and pertussis. Diphtheria and pertussis spread from person to person. Tetanus enters the body through cuts or wounds. · TETANUS (T) causes painful stiffening of the muscles. Tetanus can lead to serious health problems, including being unable to open the mouth, having trouble swallowing and breathing, or death. · DIPHTHERIA (D) can lead to difficulty breathing, heart failure, paralysis, or death. · PERTUSSIS (aP), also known as \"whooping cough,\" can cause uncontrollable, violent coughing which makes it hard to breathe, eat, or drink. Pertussis can be extremely serious in babies and young children, causing pneumonia, convulsions, brain damage, or death. In teens and adults, it can cause weight loss, loss of bladder control, passing out, and rib fractures from severe coughing. Tdap vaccine  Tdap is only for children 7 years and older, adolescents, and adults. Adolescents should receive a single dose of Tdap, preferably at age 6 or 15 years. Pregnant women should get a dose of Tdap during every pregnancy, to protect the  from pertussis. Infants are most at risk for severe, life threatening complications from pertussis. Adults who have never received Tdap should get a dose of Tdap. Also, adults should receive a booster dose every 10 years, or earlier in the case of a severe and dirty wound or burn. Booster doses can be either Tdap or Td (a different vaccine that protects against tetanus and diphtheria but not pertussis). Tdap may be given at the same time as other vaccines.   Talk with your health care provider  Tell your vaccine provider if the person getting the vaccine:  · Has had an allergic reaction after a previous dose of any vaccine that protects against tetanus, diphtheria, or pertussis, or has any severe, life threatening allergies. · Has had a coma, decreased level of consciousness, or prolonged seizures within 7 days after a previous dose of any pertussis vaccine (DTP, DTaP, or Tdap). · Has seizures or another nervous system problem. · Has ever had Guillain-Barré Syndrome (also called GBS). · Has had severe pain or swelling after a previous dose of any vaccine that protects against tetanus or diphtheria. In some cases, your health care provider may decide to postpone Tdap vaccination to a future visit. People with minor illnesses, such as a cold, may be vaccinated. People who are moderately or severely ill should usually wait until they recover before getting Tdap vaccine. Your health care provider can give you more information. Risks of a vaccine reaction  · Pain, redness, or swelling where the shot was given, mild fever, headache, feeling tired, and nausea, vomiting, diarrhea, or stomachache sometimes happen after Tdap vaccine. People sometimes faint after medical procedures, including vaccination. Tell your provider if you feel dizzy or have vision changes or ringing in the ears. As with any medicine, there is a very remote chance of a vaccine causing a severe allergic reaction, other serious injury, or death. What if there is a serious problem? An allergic reaction could occur after the vaccinated person leaves the clinic. If you see signs of a severe allergic reaction (hives, swelling of the face and throat, difficulty breathing, a fast heartbeat, dizziness, or weakness), call 9-1-1 and get the person to the nearest hospital.  For other signs that concern you, call your health care provider. Adverse reactions should be reported to the Vaccine Adverse Event Reporting System (VAERS).  Your health care provider will usually file this report, or you can do it yourself. Visit the VAERS website at www.vaers. hhs.gov or call 5-877.213.1311. VAERS is only for reporting reactions, and VAERS staff do not give medical advice. The National Vaccine Injury Compensation Program  The National Vaccine Injury Compensation Program (VICP) is a federal program that was created to compensate people who may have been injured by certain vaccines. Visit the VICP website at www.[facility].gov/vaccinecompensation or call 8-656.385.7293 to learn about the program and about filing a claim. There is a time limit to file a claim for compensation. How can I learn more? · Ask your health care provider. · Call your local or state health department. · Contact the Centers for Disease Control and Prevention (CDC):  ? Call 1-851.671.7807 (1-800-CDC-INFO) or  ? Visit CDC's website at www.cdc.gov/vaccines  Vaccine Information Statement (Interim)  Tdap (Tetanus, Diphtheria, Pertussis) Vaccine  04/01/2020  42 KEISHA Nelson [id number]  Department of Health and Human Services  Centers for Disease Control and Prevention  Many Vaccine Information Statements are available in Persian and other languages. See www.immunize.org/vis. Muchas hojas de información sobre vacunas están disponibles en español y en otros idiomas. Visite www.immunize.org/vis. Care instructions adapted under license by [facility] ([facility]). If you have questions about a medical condition or this instruction, always ask your healthcare professional. [place] [address] any warranty or liability for your use of this information.

## 2020-12-18 NOTE — PROGRESS NOTES
+FM, -Ctx, -LOF, -VB  Patient Active Problem List   Diagnosis    Irritable bowel syndrome    Echogenic focus of bowel, fetal, affecting care of mother, antepartum     Blood pressure 112/78, temperature 98.3 °F (36.8 °C), weight 142 lb 9.6 oz (64.7 kg), last menstrual period 06/07/2020, not currently breastfeeding. Reviewed kick counts, labor and pre eclampsia precautions  Feeling well  Good FM  MFM visit today  Echogenic bowel still visualized  Lagging abd circumference <3%  No evidence of previa  MFM FU at 31 weeks  Will consider tDAP for next week.  B Positive  28 week labs ordered and provided to pt

## 2020-12-21 ENCOUNTER — HOSPITAL ENCOUNTER (OUTPATIENT)
Age: 25
Setting detail: SPECIMEN
Discharge: HOME | End: 2020-12-21
Payer: COMMERCIAL

## 2020-12-21 PROBLEM — O44.02 PLACENTA PREVIA IN SECOND TRIMESTER: Status: ACTIVE | Noted: 2020-12-21

## 2020-12-21 LAB
ABSOLUTE EOS #: 0.06 K/UL (ref 0–0.44)
ABSOLUTE IMMATURE GRANULOCYTE: 0.04 K/UL (ref 0–0.3)
ABSOLUTE LYMPH #: 1.44 K/UL (ref 1.1–3.7)
ABSOLUTE MONO #: 0.68 K/UL (ref 0.1–1.2)
BASOPHILS # BLD: 0 % (ref 0–2)
BASOPHILS ABSOLUTE: <0.03 K/UL (ref 0–0.2)
DIFFERENTIAL TYPE: ABNORMAL
EOSINOPHILS RELATIVE PERCENT: 1 % (ref 1–4)
GLUCOSE ADMINISTRATION: NORMAL
GLUCOSE TOLERANCE SCREEN 50G: 87 MG/DL (ref 70–135)
HCT VFR BLD CALC: 34 % (ref 36.3–47.1)
HEMOGLOBIN: 10.5 G/DL (ref 11.9–15.1)
IMMATURE GRANULOCYTES: 1 %
LYMPHOCYTES # BLD: 16 % (ref 24–43)
MCH RBC QN AUTO: 29.3 PG (ref 25.2–33.5)
MCHC RBC AUTO-ENTMCNC: 30.9 G/DL (ref 28.4–34.8)
MCV RBC AUTO: 95 FL (ref 82.6–102.9)
MONOCYTES # BLD: 8 % (ref 3–12)
NRBC AUTOMATED: 0 PER 100 WBC
PDW BLD-RTO: 12.5 % (ref 11.8–14.4)
PLATELET # BLD: 237 K/UL (ref 138–453)
PLATELET ESTIMATE: ABNORMAL
PMV BLD AUTO: 10.3 FL (ref 8.1–13.5)
RBC # BLD: 3.58 M/UL (ref 3.95–5.11)
RBC # BLD: ABNORMAL 10*6/UL
SEG NEUTROPHILS: 74 % (ref 36–65)
SEGMENTED NEUTROPHILS ABSOLUTE COUNT: 6.62 K/UL (ref 1.5–8.1)
WBC # BLD: 8.9 K/UL (ref 3.5–11.3)
WBC # BLD: ABNORMAL 10*3/UL

## 2021-01-04 ENCOUNTER — ROUTINE PRENATAL (OUTPATIENT)
Dept: OBGYN CLINIC | Age: 26
End: 2021-01-04
Payer: COMMERCIAL

## 2021-01-04 VITALS
TEMPERATURE: 96.9 F | DIASTOLIC BLOOD PRESSURE: 82 MMHG | BODY MASS INDEX: 23.89 KG/M2 | SYSTOLIC BLOOD PRESSURE: 102 MMHG | WEIGHT: 148 LBS

## 2021-01-04 DIAGNOSIS — Z23 NEED FOR TDAP VACCINATION: ICD-10-CM

## 2021-01-04 DIAGNOSIS — Z3A.29 29 WEEKS GESTATION OF PREGNANCY: Primary | ICD-10-CM

## 2021-01-04 DIAGNOSIS — Z34.03 SUPERVISION OF NORMAL FIRST PREGNANCY IN THIRD TRIMESTER: ICD-10-CM

## 2021-01-04 PROCEDURE — 0502F SUBSEQUENT PRENATAL CARE: CPT | Performed by: NURSE PRACTITIONER

## 2021-01-04 PROCEDURE — 90471 IMMUNIZATION ADMIN: CPT | Performed by: NURSE PRACTITIONER

## 2021-01-04 PROCEDURE — 90715 TDAP VACCINE 7 YRS/> IM: CPT | Performed by: NURSE PRACTITIONER

## 2021-01-04 NOTE — PROGRESS NOTES
After obtaining consent, and per orders of Phil Gillespie CNP, injection of TDAP given IM in Right deltoid by Jeanne Devlin. Patient instructed to remain in clinic for 20 minutes afterwards, and to report any adverse reaction to me immediately.   LOT [id number]  EXP 7/3/2022  [address] [phone]

## 2021-01-04 NOTE — PATIENT INSTRUCTIONS
Patient Education        Weeks 30 to 28 of Your Pregnancy: Care Instructions  Your Care Instructions     You have made it to the final months of your pregnancy. By now, your baby is really starting to look like a baby, with hair and plump skin. As you enter the final weeks of pregnancy, the reality of having a baby may start to set in. This is the time to settle on a name, get your household in order, set up a safe nursery, and find quality  if needed. Doing these things in advance will allow you to focus on caring for and enjoying your new baby. You may also want to have a tour of your hospital's labor and delivery unit to get a better idea of what to expect while you are in the hospital.  During these last months, it is very important to take good care of yourself and pay attention to what your body needs. If your doctor says it is okay for you to work, don't push yourself too hard. Use the tips provided in this care sheet to ease heartburn and care for varicose veins. If you haven't already had the Tdap shot during this pregnancy, talk to your doctor about getting it. It will help protect your  against pertussis infection. Follow-up care is a key part of your treatment and safety. Be sure to make and go to all appointments, and call your doctor if you are having problems. It's also a good idea to know your test results and keep a list of the medicines you take. How can you care for yourself at home? Pay attention to your baby's movements  · You should feel your baby move several times every day. · Your baby now turns less, and kicks and jabs more. · Your baby sleeps 20 to 45 minutes at a time and is more active at certain times of day. · If your doctor wants you to count your baby's kicks:  ? Empty your bladder, and lie on your side or relax in a comfortable chair. ? Write down your start time. ? Pay attention only to your baby's movements. Count any movement except hiccups. ?  After you have counted 10 movements, write down your stop time. ? Write down how many minutes it took for your baby to move 10 times. ? If an hour goes by and you have not recorded 10 movements, have something to eat or drink and then count for another hour. If you do not record 10 movements in either hour, call your doctor. Ease heartburn  · Eat small, frequent meals. · Do not eat chocolate, peppermint, or very spicy foods. Avoid drinks with caffeine, such as coffee, tea, and sodas. · Avoid bending over or lying down after meals. · Talk a short walk after you eat. · If heartburn is a problem at night, do not eat for 2 hours before bedtime. · Take antacids like Mylanta, Maalox, Rolaids, or Tums. Do not take antacids that have sodium bicarbonate. Care for varicose veins  · Varicose veins are blood vessels that stretch out with the extra blood during pregnancy. Your legs may ache or throb. Most varicose veins will go away after the birth. · Avoid standing for long periods of time. Sit with your legs crossed at the ankles, not the knees. · Sit with your feet propped up. · Avoid tight clothing or stockings. Wear support hose. · Exercise regularly. Try walking for at least 30 minutes a day. Where can you learn more? Go to https://QuinStreetharlanFoneshow.You.Do. org and sign in to your [employer] account. Enter C741 in the [facility] box to learn more about \"Weeks 30 to 32 of Your Pregnancy: Care Instructions. \"     If you do not have an account, please click on the \"Sign Up Now\" link. Current as of: February 11, 2020               Content Version: 12.6  © 5328-1216 [employer]. Care instructions adapted under license by [facility] ([facility]). If you have questions about a medical condition or this instruction, always ask your healthcare professional. [place] [address] any warranty or liability for your use of this information.        Patient Education        Counting Your Baby's Kicks: Care Instructions  Your Care Instructions     Counting your baby's kicks is one way your doctor can tell that your baby is healthy. Most women--especially in a first pregnancy--feel their baby move for the first time between 16 and 22 weeks. The movement may feel like flutters rather than kicks. Your baby may move more at certain times of the day. When you are active, you may notice less kicking than when you are resting. At your prenatal visits, your doctor will ask whether the baby is active. In your last trimester, your doctor may ask you to count the number of times you feel your baby move. Follow-up care is a key part of your treatment and safety. Be sure to make and go to all appointments, and call your doctor if you are having problems. It's also a good idea to know your test results and keep a list of the medicines you take. How do you count fetal kicks? · A common method of checking your baby's movement is to count the number of kicks or moves you feel in 1 hour. Ten movements (such as kicks, flutters, or rolls) in 1 hour are normal. Some doctors suggest that you count in the morning until you get to 10 movements. Then you can quit for that day and start again the next day. · Pick your baby's most active time of day to count. This may be any time from morning to evening. · If you do not feel 10 movements in an hour, your baby may be sleeping. Wait for the next hour and count again. When should you call for help? Call your doctor now or seek immediate medical care if:    · You noticed that your baby has stopped moving or is moving much less than normal.   Watch closely for changes in your health, and be sure to contact your doctor if you have any problems. Where can you learn more? Go to https://chpehowardeweb.AimWith. org and sign in to your [employer] account. Enter X638 in the [employer] box to learn more about \"Counting Your Baby's Kicks: Care Instructions. \"     If you do not have an account, please click on the \"Sign Up Now\" link. Current as of: February 11, 2020               Content Version: 12.6  © 2006-2020 [employer], [employer]. Care instructions adapted under license by [facility]"[employer] [facility] ([facility]). If you have questions about a medical condition or this instruction, always ask your healthcare professional. [address] any warranty or liability for your use of this information. Patient Education        Learning About When to Call Your Doctor During Pregnancy (After 20 Weeks)  Your Care Instructions  It's common to have concerns about what might be a problem during pregnancy. Although most pregnant women don't have any serious problems, it's important to know when to call your doctor if you have certain symptoms or signs of labor. These are general suggestions. Your doctor may give you some more information about when to call. When to call your doctor (after 20 weeks)  Call 911 anytime you think you may need emergency care. For example, call if:  · You have severe vaginal bleeding. · You have sudden, severe pain in your belly. · You passed out (lost consciousness). · You have a seizure. · You see or feel the umbilical cord. · You think you are about to deliver your baby and can't make it safely to the hospital.  Call your doctor now or seek immediate medical care if:  · You have vaginal bleeding. · You have belly pain. · You have a fever. · You have symptoms of preeclampsia, such as:  ? Sudden swelling of your face, hands, or feet. ? New vision problems (such as dimness, blurring, or seeing spots). ? A severe headache. · You have a sudden release of fluid from your vagina. (You think your water broke.)  · You think that you may be in labor. This means that you've had at least 6 contractions in an hour. · You notice that your baby has stopped moving or is moving much less than normal.  · You have symptoms of a urinary tract infection.  These may include:  ? Pain or burning when you urinate. ? A frequent need to urinate without being able to pass much urine. ? Pain in the flank, which is just below the rib cage and above the waist on either side of the back. ? Blood in your urine. Watch closely for changes in your health, and be sure to contact your doctor if:  · You have vaginal discharge that smells bad. · You have skin changes, such as:  ? A rash. ? Itching. ? Yellow color to your skin. · You have other concerns about your pregnancy. If you have labor signs at 37 weeks or more  If you have signs of labor at 37 weeks or more, your doctor may tell you to call when your labor becomes more active. Symptoms of active labor include:  · Contractions that are regular. · Contractions that are less than 5 minutes apart. · Contractions that are hard to talk through. Follow-up care is a key part of your treatment and safety. Be sure to make and go to all appointments, and call your doctor if you are having problems. It's also a good idea to know your test results and keep a list of the medicines you take. Where can you learn more? Go to https://Guzupepiceweb.healthModusP. org and sign in to your [employer] account. Enter  in the [employer] box to learn more about \"Learning About When to Call Your Doctor During Pregnancy (After 20 Weeks). \"     If you do not have an account, please click on the \"Sign Up Now\" link. Current as of: February 11, 2020               Content Version: 12.6  © 2006-2020 [employer]. Care instructions adapted under license by [facility] ([facility]). If you have questions about a medical condition or this instruction, always ask your healthcare professional. [place] [address] any warranty or liability for your use of this information.        Patient Education        High-Fiber Diet: Care Instructions  Your Care Instructions     A high-fiber diet may help you relieve constipation and feel less bloated. Your doctor and dietitian will help you make a high-fiber eating plan based on your personal needs. The plan will include the things you like to eat. It will also make sure that you get 30 grams of fiber a day. Before you make changes to the way you eat, be sure to talk with your doctor or dietitian. Follow-up care is a key part of your treatment and safety. Be sure to make and go to all appointments, and call your doctor if you are having problems. It's also a good idea to know your test results and keep a list of the medicines you take. How can you care for yourself at home? · You can increase how much fiber you get if you eat more of certain foods. These foods include:  ? Whole-grain breads and cereals. ? Fruits, such as pears, apples, and peaches. Eat the skins, peels, and seeds, if you can.  ? Vegetables, such as broccoli, cabbage, spinach, carrots, asparagus, and squash. ? Starchy vegetables. These include potatoes with skins, kidney beans, and lima beans. · Take a fiber supplement every day if your doctor recommends it. Examples are Benefiber, Citrucel, FiberCon, and Metamucil. Ask your doctor how much to take. · Drink plenty of fluids, enough so that your urine is light yellow or clear like water. If you have kidney, heart, or liver disease and have to limit fluids, talk with your doctor before you increase the amount of fluids you drink. · Get some exercise every day. Exercise helps stool move through the colon. It also helps prevent constipation. · Keep a food diary. Try to notice and write down what foods cause gas, pain, or other symptoms. Then you can avoid these foods. Where can you learn more? Go to https://Open UtilitypeGrabTaxi.Geolab-IT. org and sign in to your wst.cn account. Enter L143 in the [employer] box to learn more about \"High-Fiber Diet: Care Instructions. \"     If you do not have an account, please click on the \"Sign Up Now\" link.   Current as of: August 22, 2019               Content Version: 12.6  © 0530-7278 [employer]. Care instructions adapted under license by [facility] ([facility]). If you have questions about a medical condition or this instruction, always ask your healthcare professional. [address] any warranty or liability for your use of this information.

## 2021-01-15 ENCOUNTER — ROUTINE PRENATAL (OUTPATIENT)
Dept: PERINATAL CARE | Age: 26
End: 2021-01-15
Payer: COMMERCIAL

## 2021-01-15 VITALS
WEIGHT: 149 LBS | HEART RATE: 97 BPM | RESPIRATION RATE: 16 BRPM | SYSTOLIC BLOOD PRESSURE: 107 MMHG | BODY MASS INDEX: 23.95 KG/M2 | HEIGHT: 66 IN | DIASTOLIC BLOOD PRESSURE: 64 MMHG | TEMPERATURE: 98.2 F

## 2021-01-15 DIAGNOSIS — O36.5990 POOR FETAL GROWTH AFFECTING PREGNANCY, ANTEPARTUM, SINGLE OR UNSPECIFIED FETUS: ICD-10-CM

## 2021-01-15 DIAGNOSIS — Z3A.31 31 WEEKS GESTATION OF PREGNANCY: ICD-10-CM

## 2021-01-15 DIAGNOSIS — O35.DXX0 ECHOGENIC FOCUS OF BOWEL OF FETUS AFFECTING ANTEPARTUM CARE OF MOTHER, SINGLE OR UNSPECIFIED FETUS: Primary | ICD-10-CM

## 2021-01-15 DIAGNOSIS — Z13.89 ENCOUNTER FOR ROUTINE SCREENING FOR MALFORMATION USING ULTRASONICS: ICD-10-CM

## 2021-01-15 DIAGNOSIS — Z03.74 SUSPECTED PROBLEM WITH FETAL GROWTH NOT FOUND: ICD-10-CM

## 2021-01-15 DIAGNOSIS — Z36.4 ANTENATAL SCREENING FOR FETAL GROWTH RETARDATION USING ULTRASONICS: ICD-10-CM

## 2021-01-15 PROCEDURE — 76805 OB US >/= 14 WKS SNGL FETUS: CPT | Performed by: OBSTETRICS & GYNECOLOGY

## 2021-01-15 PROCEDURE — 76819 FETAL BIOPHYS PROFIL W/O NST: CPT | Performed by: OBSTETRICS & GYNECOLOGY

## 2021-01-18 ENCOUNTER — ROUTINE PRENATAL (OUTPATIENT)
Dept: OBGYN CLINIC | Age: 26
End: 2021-01-18

## 2021-01-18 VITALS — DIASTOLIC BLOOD PRESSURE: 58 MMHG | BODY MASS INDEX: 24.05 KG/M2 | SYSTOLIC BLOOD PRESSURE: 112 MMHG | WEIGHT: 149 LBS

## 2021-01-18 DIAGNOSIS — Z3A.31 31 WEEKS GESTATION OF PREGNANCY: Primary | ICD-10-CM

## 2021-01-18 PROCEDURE — 0502F SUBSEQUENT PRENATAL CARE: CPT | Performed by: STUDENT IN AN ORGANIZED HEALTH CARE EDUCATION/TRAINING PROGRAM

## 2021-01-18 NOTE — PROGRESS NOTES
Prenatal Visit    Linda Alarcon is a 27 y.o. female  at 35w11d    The patient was seen and evaluated. Reports positive fetal movements. She denies headache, vision changes, RUQ pain, contractions, vaginal bleeding and leakage of fluid. The patient was instructed on fetal kick counts and was given a kick sheet to complete every 8 hours. She was instructed that the baby should move at a minimum of ten times within one hour after a meal. The patient was instructed to lay down on her left side twenty minutes after eating and count movements for up to one hour with a target value of ten movements. She was instructed to notify the office if she did not make that target after two attempts or if after any attempt there was less than four movements. The patient admits to that the targets have been made. The patient already received the T-Dap Vaccine (27-36 weeks) this pregnancy. The patient already received the influenza vaccine this year. The problem list reflects the active issues addressed during today's visit    Vitals:    BP: (!) 112/58  Weight: 149 lb (67.6 kg)  Patient Position: Sitting  Fundal Height (cm): 32 cm  Fetal Heart Rate: 155  Movement: Present     28 Week Labs: The patient is RH +, Rhogam not indicated  ABO/Rh   Date Value Ref Range Status   2020 B POSITIVE  Final       1hr GTT: 87   28 week CBC:   Lab Results   Component Value Date    WBC 8.9 2020    HGB 10.5 (L) 2020    HCT 34.0 (L) 2020    MCV 95.0 2020     2020       Assessment & Plan:  Linda Alarcon is a 27 y.o. female  at 35w11d   - 28 week labs completed   - discussed recommendations for TDAP immunization, patient already received TDAP. - Next [facility] appointment 21 for growth US   -  labor and kick count precautions given. - Signs and symptoms of preeclampsia reviewed.     Patient Active Problem List    Diagnosis Date Noted    Placenta previa (RESOLVED 20) 2020    Echogenic focus of bowel, fetal, affecting care of mother, antepartum 2020     NIPT pending      Irritable bowel syndrome 2019     Return in about 2 weeks (around 2021) for ISRA. The patient was counseled on Labor & Delivery. Route of delivery and counseling on vaginal, operative vaginal, and  sections were completed with the risks of each to both the patient as well as her baby. The possibility of a blood transfusion was discussed as well. The patient was not opposed to receiving a transfusion if needed. The patient was counseled on types of analgesia during labor. The patient has been instructed to call the office at anytime prior to going into the hospital so the on-call physician may direct her to the appropriate facility for care. Exceptions were reviewed including but not limited to: Decreased fetal movement, vaginal Bleeding or hemorrhage, trauma, readily expectant delivery, or any instance where she feels 911 should be utilized.     Ellen Ewing [facility] Ob/Gyn   2021, 12:11 PM

## 2021-02-01 ENCOUNTER — ROUTINE PRENATAL (OUTPATIENT)
Dept: OBGYN CLINIC | Age: 26
End: 2021-02-01

## 2021-02-01 VITALS — SYSTOLIC BLOOD PRESSURE: 108 MMHG | BODY MASS INDEX: 24.61 KG/M2 | DIASTOLIC BLOOD PRESSURE: 58 MMHG | WEIGHT: 152.5 LBS

## 2021-02-01 DIAGNOSIS — Z3A.33 33 WEEKS GESTATION OF PREGNANCY: Primary | ICD-10-CM

## 2021-02-01 DIAGNOSIS — Z34.03 SUPERVISION OF NORMAL FIRST PREGNANCY IN THIRD TRIMESTER: ICD-10-CM

## 2021-02-01 PROCEDURE — 0502F SUBSEQUENT PRENATAL CARE: CPT | Performed by: NURSE PRACTITIONER

## 2021-02-01 RX ORDER — DOCUSATE SODIUM 100 MG/1
100 CAPSULE, LIQUID FILLED ORAL 2 TIMES DAILY
Status: ON HOLD | COMMUNITY
End: 2021-03-09 | Stop reason: SDUPTHER

## 2021-02-01 NOTE — PROGRESS NOTES
+FM, -Ctx, -LOF, -VB  Patient Active Problem List   Diagnosis    Irritable bowel syndrome    Echogenic focus of bowel, fetal, affecting care of mother, antepartum    Placenta previa (RESOLVED 12/18/20)     Blood pressure (!) 108/58, weight 152 lb 8 oz (69.2 kg), last menstrual period 06/07/2020, not currently breastfeeding.   Reviewed kick counts, labor and pre eclampsia precautions  Feeling well  Good FM  Likely breastfeeding- will call insurance for information for breast  Discussed contraception- considering options  GBS next visit

## 2021-02-01 NOTE — PATIENT INSTRUCTIONS
Patient Education        Weeks 32 to 29 of Your Pregnancy: Care Instructions  Your Care Instructions     During the last few weeks of your pregnancy, you may have more aches and pains. It's important to rest when you can. Your growing baby is putting more pressure on your bladder. So you may need to urinate more often. Hemorrhoids are also common. These are painful, itchy veins in the rectal area. In the 36th week, most women have a test for group B streptococcus (GBS). GBS is a common bacteria that can live in the vagina and rectum. It can make your baby sick after birth. If you test positive, you will get antibiotics during labor. These will keep your baby from getting the bacteria. You may want to talk with your doctor about banking your baby's umbilical cord blood. This is the blood left in the cord after birth. If you want to save this blood, you must arrange it ahead of time. You can't decide at the last minute. If you haven't already had the Tdap shot during this pregnancy, talk to your doctor about getting it. It will help protect your  against pertussis infection. Follow-up care is a key part of your treatment and safety. Be sure to make and go to all appointments, and call your doctor if you are having problems. It's also a good idea to know your test results and keep a list of the medicines you take. How can you care for yourself at home? Ease hemorrhoids  · Get more liquids, fruits, vegetables, and fiber in your diet. This will help keep your stools soft. · Avoid sitting for too long. Lie on your left side several times a day. · Clean yourself with soft, moist toilet paper. Or you can use witch hazel pads or personal hygiene pads. · If you are uncomfortable, try ice packs. Or you can sit in a warm sitz bath. Do these for 20 minutes at a time, as needed. · Use hydrocortisone cream for pain and itching. Two examples are Anusol and Preparation H Hydrocortisone.   · Ask your doctor about taking an over-the-counter stool softener. Consider breastfeeding  · Experts recommend that women breastfeed for 1 year or longer. · Breast milk may help protect your child from some health problems.  babies are less likely than formula-fed babies to:  ? Get ear infections, colds, diarrhea, and pneumonia. ? Be obese or get diabetes later in life. · Women who breastfeed have less bleeding after the birth. Their uteruses also shrink back faster. · Some women who breastfeed lose weight faster. Making milk burns calories. · Breastfeeding can lower your risk of breast cancer, ovarian cancer, and osteoporosis. Decide about circumcision for boys  · As you make this decision, it may help to think about your personal, Mandaeism, and family traditions. You get to decide if you will keep your son's penis natural or if he will be circumcised. · If you decide that you would like to have your baby circumcised, talk with your doctor. You can share your concerns about pain. And you can discuss your preferences for anesthesia. Where can you learn more? Go to https://Able Device.FOLUP. org and sign in to your [employer] account. Enter V274 in the [employer] box to learn more about \"Weeks 32 to 34 of Your Pregnancy: Care Instructions. \"     If you do not have an account, please click on the \"Sign Up Now\" link. Current as of: February 11, 2020               Content Version: 12.6  © 8998-4260 [employer]orporated. Care instructions adapted under license by [facility] ([facility]). If you have questions about a medical condition or this instruction, always ask your healthcare professional. [place] [address] any warranty or liability for your use of this information. Patient Education        Counting Your Baby's Kicks: Care Instructions  Your Care Instructions     Counting your baby's kicks is one way your doctor can tell that your baby is healthy.  Most women--especially in a first pregnancy--feel their baby move for the first time between 16 and 22 weeks. The movement may feel like flutters rather than kicks. Your baby may move more at certain times of the day. When you are active, you may notice less kicking than when you are resting. At your prenatal visits, your doctor will ask whether the baby is active. In your last trimester, your doctor may ask you to count the number of times you feel your baby move. Follow-up care is a key part of your treatment and safety. Be sure to make and go to all appointments, and call your doctor if you are having problems. It's also a good idea to know your test results and keep a list of the medicines you take. How do you count fetal kicks? · A common method of checking your baby's movement is to count the number of kicks or moves you feel in 1 hour. Ten movements (such as kicks, flutters, or rolls) in 1 hour are normal. Some doctors suggest that you count in the morning until you get to 10 movements. Then you can quit for that day and start again the next day. · Pick your baby's most active time of day to count. This may be any time from morning to evening. · If you do not feel 10 movements in an hour, your baby may be sleeping. Wait for the next hour and count again. When should you call for help? Call your doctor now or seek immediate medical care if:    · You noticed that your baby has stopped moving or is moving much less than normal.   Watch closely for changes in your health, and be sure to contact your doctor if you have any problems. Where can you learn more? Go to https://MVP Vault.BollingoBlog. org and sign in to your [employer] account. Enter O406 in the [facility] box to learn more about \"Counting Your Baby's Kicks: Care Instructions. \"     If you do not have an account, please click on the \"Sign Up Now\" link. Current as of: February 11, 2020               Content Version: 12.6  © 5693-7162 [employer]Channel, Incorporated. Care instructions adapted under license by [facility] ([facility]). If you have questions about a medical condition or this instruction, always ask your healthcare professional. [place] [address] any warranty or liability for your use of this information. Patient Education        Learning About When to Call Your Doctor During Pregnancy (After 20 Weeks)  Your Care Instructions  It's common to have concerns about what might be a problem during pregnancy. Although most pregnant women don't have any serious problems, it's important to know when to call your doctor if you have certain symptoms or signs of labor. These are general suggestions. Your doctor may give you some more information about when to call. When to call your doctor (after 20 weeks)  Call 911 anytime you think you may need emergency care. For example, call if:  · You have severe vaginal bleeding. · You have sudden, severe pain in your belly. · You passed out (lost consciousness). · You have a seizure. · You see or feel the umbilical cord. · You think you are about to deliver your baby and can't make it safely to the hospital.  Call your doctor now or seek immediate medical care if:  · You have vaginal bleeding. · You have belly pain. · You have a fever. · You have symptoms of preeclampsia, such as:  ? Sudden swelling of your face, hands, or feet. ? New vision problems (such as dimness, blurring, or seeing spots). ? A severe headache. · You have a sudden release of fluid from your vagina. (You think your water broke.)  · You think that you may be in labor. This means that you've had at least 6 contractions in an hour. · You notice that your baby has stopped moving or is moving much less than normal.  · You have symptoms of a urinary tract infection. These may include:  ? Pain or burning when you urinate. ? A frequent need to urinate without being able to pass much urine.   ? Pain in the flank, which is just below the rib cage and above the waist on either side of the back. ? Blood in your urine. Watch closely for changes in your health, and be sure to contact your doctor if:  · You have vaginal discharge that smells bad. · You have skin changes, such as:  ? A rash. ? Itching. ? Yellow color to your skin. · You have other concerns about your pregnancy. If you have labor signs at 37 weeks or more  If you have signs of labor at 37 weeks or more, your doctor may tell you to call when your labor becomes more active. Symptoms of active labor include:  · Contractions that are regular. · Contractions that are less than 5 minutes apart. · Contractions that are hard to talk through. Follow-up care is a key part of your treatment and safety. Be sure to make and go to all appointments, and call your doctor if you are having problems. It's also a good idea to know your test results and keep a list of the medicines you take. Where can you learn more? Go to https://TRUE linkswearpeSmart Baking Company.Odilo. org and sign in to your [employer] [facility] account. Enter  in the [employer] box to learn more about \"Learning About When to Call Your Doctor During Pregnancy (After 20 Weeks). \"     If you do not have an account, please click on the \"Sign Up Now\" link. Current as of: February 11, 2020               Content Version: 12.6  © 1055-2951 [employer] Incorporated. Care instructions adapted under license by [facility] ([facility]). If you have questions about a medical condition or this instruction, always ask your healthcare professional. [place] any warranty or liability for your use of this information. Patient Education        Group B Strep During Pregnancy: Care Instructions  Your Care Instructions     Group B strep infection is caused by a type of bacteria. It's a different kind of bacteria than the kind that causes strep throat. You may have this kind of bacteria in your body.  Sometimes it may cause an infection, but most of the time it doesn't make you sick or cause symptoms. But if you pass the bacteria to your baby during the birth, it can cause serious health problems for your baby. If you have this bacteria in your body, you will get antibiotics when you are in labor. Antibiotics help prevent problems for a  baby. After birth, doctors will watch and may test your baby. If your baby tests positive for Group B strep, he or she will get antibiotics. If you plan to breastfeed your baby, don't worry. It will be safe to breastfeed. Follow-up care is a key part of your treatment and safety. Be sure to make and go to all appointments, and call your doctor if you are having problems. It's also a good idea to know your test results and keep a list of the medicines you take. How can you care for yourself at home? · If your doctor has prescribed antibiotics, take them as directed. Do not stop taking them just because you feel better. You need to take the full course of antibiotics. · Tell your doctor if you are allergic to any antibiotic. · If your water breaks, go to the hospital right away. Your doctor will give you antibiotics to help protect your baby from infection. · Tell the doctors and nurses at the hospital that you tested positive for group B strep. When should you call for help? Call your doctor now or seek immediate medical care if:    · You have symptoms of a urinary tract infection. These may include:  ? Pain or burning when you urinate. ? A frequent need to urinate without being able to pass much urine. ? Pain in the flank, which is just below the rib cage and above the waist on either side of the back. ? Blood in your urine. ? A fever.     · You think your water has broken.     · You have pain in your belly or pelvis. Watch closely for changes in your health, and be sure to contact your doctor if you have any problems. Where can you learn more? Go to https://daphne.health-partners. org and sign in to your [employer] account. Enter M001 in the [facility] box to learn more about \"Group B Strep During Pregnancy: Care Instructions. \"     If you do not have an account, please click on the \"Sign Up Now\" link. Current as of: February 11, 2020               Content Version: 12.6  © 4040-0147 [employer], [employer]orporated. Care instructions adapted under license by [facility] ([facility]). If you have questions about a medical condition or this instruction, always ask your healthcare professional. [address] any warranty or liability for your use of this information.

## 2021-02-08 ENCOUNTER — ROUTINE PRENATAL (OUTPATIENT)
Dept: PERINATAL CARE | Age: 26
End: 2021-02-08
Payer: COMMERCIAL

## 2021-02-08 VITALS
TEMPERATURE: 98.3 F | HEART RATE: 84 BPM | RESPIRATION RATE: 16 BRPM | SYSTOLIC BLOOD PRESSURE: 97 MMHG | BODY MASS INDEX: 24.62 KG/M2 | DIASTOLIC BLOOD PRESSURE: 60 MMHG | HEIGHT: 66 IN | WEIGHT: 153.22 LBS

## 2021-02-08 DIAGNOSIS — Z36.4 ANTENATAL SCREENING FOR FETAL GROWTH RETARDATION USING ULTRASONICS: ICD-10-CM

## 2021-02-08 DIAGNOSIS — Z3A.34 34 WEEKS GESTATION OF PREGNANCY: ICD-10-CM

## 2021-02-08 DIAGNOSIS — O35.DXX0 ECHOGENIC FOCUS OF BOWEL OF FETUS AFFECTING ANTEPARTUM CARE OF MOTHER, SINGLE OR UNSPECIFIED FETUS: ICD-10-CM

## 2021-02-08 DIAGNOSIS — O36.5990 POOR FETAL GROWTH AFFECTING PREGNANCY, ANTEPARTUM, SINGLE OR UNSPECIFIED FETUS: Primary | ICD-10-CM

## 2021-02-08 PROCEDURE — 76819 FETAL BIOPHYS PROFIL W/O NST: CPT | Performed by: OBSTETRICS & GYNECOLOGY

## 2021-02-08 PROCEDURE — 76820 UMBILICAL ARTERY ECHO: CPT | Performed by: OBSTETRICS & GYNECOLOGY

## 2021-02-08 PROCEDURE — 76816 OB US FOLLOW-UP PER FETUS: CPT | Performed by: OBSTETRICS & GYNECOLOGY

## 2021-02-08 PROCEDURE — 76821 MIDDLE CEREBRAL ARTERY ECHO: CPT | Performed by: OBSTETRICS & GYNECOLOGY

## 2021-02-18 ENCOUNTER — ROUTINE PRENATAL (OUTPATIENT)
Dept: OBGYN CLINIC | Age: 26
End: 2021-02-18

## 2021-02-18 ENCOUNTER — HOSPITAL ENCOUNTER (OUTPATIENT)
Age: 26
Setting detail: SPECIMEN
Discharge: HOME | End: 2021-02-18
Payer: COMMERCIAL

## 2021-02-18 VITALS
HEIGHT: 66 IN | TEMPERATURE: 97.9 F | BODY MASS INDEX: 25.23 KG/M2 | DIASTOLIC BLOOD PRESSURE: 58 MMHG | WEIGHT: 157 LBS | SYSTOLIC BLOOD PRESSURE: 108 MMHG

## 2021-02-18 DIAGNOSIS — Z3A.36 36 WEEKS GESTATION OF PREGNANCY: ICD-10-CM

## 2021-02-18 DIAGNOSIS — O36.5990 PREGNANCY AFFECTED BY FETAL GROWTH RESTRICTION: ICD-10-CM

## 2021-02-18 DIAGNOSIS — R10.2 PELVIC PAIN IN PREGNANCY, ANTEPARTUM, THIRD TRIMESTER: ICD-10-CM

## 2021-02-18 DIAGNOSIS — Z3A.36 36 WEEKS GESTATION OF PREGNANCY: Primary | ICD-10-CM

## 2021-02-18 DIAGNOSIS — O26.893 PELVIC PAIN IN PREGNANCY, ANTEPARTUM, THIRD TRIMESTER: ICD-10-CM

## 2021-02-18 LAB — GBS, EXTERNAL RESULT: NEGATIVE

## 2021-02-18 PROCEDURE — 0502F SUBSEQUENT PRENATAL CARE: CPT | Performed by: STUDENT IN AN ORGANIZED HEALTH CARE EDUCATION/TRAINING PROGRAM

## 2021-02-18 NOTE — PROGRESS NOTES
Prenatal Visit    Nurys River is a 27 y.o. female  at 43w3d    The patient was seen and evaluated. Reports positive fetal movements. She denies headache, vision changes, RUQ pain, contractions, vaginal bleeding and leakage of fluid. The patient was instructed on fetal kick counts and was given a kick sheet to complete every 8 hours. She was instructed that the baby should move at a minimum of ten times within one hour after a meal. The patient was instructed to lay down on her left side twenty minutes after eating and count movements for up to one hour with a target value of ten movements. She was instructed to notify the office if she did not make that target after two attempts or if after any attempt there was less than four movements. The patient admits to that the targets have been made. The patient already received the T-Dap Vaccine (27-36 weeks) this pregnancy. The patient already received the influenza vaccine this year. The problem list reflects the active issues addressed during today's visit. Allergies:  No Known Allergies    Vitals:    BP: (!) 108/58  Weight: 157 lb (71.2 kg)  Fundal Height (cm): 36 cm  Fetal Heart Rate: 154  Movement: Present     Labs:  Group Beta Strep collection was done. Sensitivities for clindamycin and erythromycin were not ordered. Assessment & Plan:  Nurys River is a 27 y.o. female  at 43w3d   - GBS testing was in process  Patient Active Problem List    Diagnosis Date Noted    Pregnancy affected by fetal growth restriction 2021     [facility] 21: EFW 2051 g ([place]), 19%ile, AC <3%ile, dopplers normal. Plan for delivery between 38/0-39/0 based on ACOG PB #227.       Placenta previa (RESOLVED 20) 2020    Echogenic focus of bowel, fetal, affecting care of mother, antepartum 2020     Mayte low risk 20      Irritable bowel syndrome 2019      -  Next [facility] appt on 21   - IOL to be scheduled between 38w0d and 39w0d. Patient desires closer to 39 weeks if possible. - COVID to be scheduled accordingly    -  labor and kick count precautions given. - Signs and symptoms of preeclampsia reviewed. Return in about 1 week (around 2021) for ISRA. Route of delivery and counseling on vaginal, operative vaginal, and  sections were completed with the risks of each to both the patient as well as her baby. The possibility of a blood transfusion was discussed as well. The patient was not opposed to receiving a transfusion if needed. The patient was counseled on types of analgesia during labor. The patient has been instructed to call the office at anytime prior to going into the hospital so the on-call physician may direct her to the appropriate facility for care. Exceptions were reviewed including but not limited to: Decreased fetal movement, vaginal Bleeding or hemorrhage, trauma, readily expectant delivery, or any instance where she feels 911 should be utilized.     Ellen Marlow Koi 135[address] Ob/Gyn   2021, 9:37 AM

## 2021-02-19 ENCOUNTER — ROUTINE PRENATAL (OUTPATIENT)
Dept: PERINATAL CARE | Age: 26
End: 2021-02-19
Payer: COMMERCIAL

## 2021-02-19 VITALS
HEART RATE: 97 BPM | BODY MASS INDEX: 25.23 KG/M2 | DIASTOLIC BLOOD PRESSURE: 66 MMHG | RESPIRATION RATE: 16 BRPM | TEMPERATURE: 97.1 F | HEIGHT: 66 IN | SYSTOLIC BLOOD PRESSURE: 104 MMHG | WEIGHT: 157 LBS

## 2021-02-19 DIAGNOSIS — O35.DXX0 ECHOGENIC FOCUS OF BOWEL OF FETUS AFFECTING ANTEPARTUM CARE OF MOTHER, SINGLE OR UNSPECIFIED FETUS: ICD-10-CM

## 2021-02-19 DIAGNOSIS — Z3A.36 36 WEEKS GESTATION OF PREGNANCY: ICD-10-CM

## 2021-02-19 DIAGNOSIS — O36.5990 POOR FETAL GROWTH AFFECTING PREGNANCY, ANTEPARTUM, SINGLE OR UNSPECIFIED FETUS: Primary | ICD-10-CM

## 2021-02-19 PROCEDURE — 76821 MIDDLE CEREBRAL ARTERY ECHO: CPT | Performed by: OBSTETRICS & GYNECOLOGY

## 2021-02-19 PROCEDURE — 99999 PR OFFICE/OUTPT VISIT,PROCEDURE ONLY: CPT | Performed by: OBSTETRICS & GYNECOLOGY

## 2021-02-19 PROCEDURE — 76819 FETAL BIOPHYS PROFIL W/O NST: CPT | Performed by: OBSTETRICS & GYNECOLOGY

## 2021-02-19 PROCEDURE — 76815 OB US LIMITED FETUS(S): CPT | Performed by: OBSTETRICS & GYNECOLOGY

## 2021-02-19 PROCEDURE — 76820 UMBILICAL ARTERY ECHO: CPT | Performed by: OBSTETRICS & GYNECOLOGY

## 2021-02-21 LAB
CULTURE: NORMAL
Lab: NORMAL
SPECIMEN DESCRIPTION: NORMAL

## 2021-02-24 ENCOUNTER — ROUTINE PRENATAL (OUTPATIENT)
Dept: PERINATAL CARE | Age: 26
End: 2021-02-24
Payer: COMMERCIAL

## 2021-02-24 VITALS
DIASTOLIC BLOOD PRESSURE: 63 MMHG | HEART RATE: 81 BPM | BODY MASS INDEX: 25.23 KG/M2 | SYSTOLIC BLOOD PRESSURE: 104 MMHG | HEIGHT: 66 IN | RESPIRATION RATE: 16 BRPM | WEIGHT: 157 LBS | TEMPERATURE: 97 F

## 2021-02-24 DIAGNOSIS — O35.DXX0 ECHOGENIC FOCUS OF BOWEL OF FETUS AFFECTING ANTEPARTUM CARE OF MOTHER, SINGLE OR UNSPECIFIED FETUS: ICD-10-CM

## 2021-02-24 DIAGNOSIS — O36.5930 POOR FETAL GROWTH AFFECTING MANAGEMENT OF MOTHER IN THIRD TRIMESTER, SINGLE OR UNSPECIFIED FETUS: Primary | ICD-10-CM

## 2021-02-24 PROCEDURE — 76820 UMBILICAL ARTERY ECHO: CPT | Performed by: OBSTETRICS & GYNECOLOGY

## 2021-02-24 PROCEDURE — 76821 MIDDLE CEREBRAL ARTERY ECHO: CPT | Performed by: OBSTETRICS & GYNECOLOGY

## 2021-02-24 PROCEDURE — 76819 FETAL BIOPHYS PROFIL W/O NST: CPT | Performed by: OBSTETRICS & GYNECOLOGY

## 2021-02-24 PROCEDURE — 76815 OB US LIMITED FETUS(S): CPT | Performed by: OBSTETRICS & GYNECOLOGY

## 2021-02-25 ENCOUNTER — ROUTINE PRENATAL (OUTPATIENT)
Dept: OBGYN CLINIC | Age: 26
End: 2021-02-25

## 2021-02-25 VITALS
BODY MASS INDEX: 25.7 KG/M2 | TEMPERATURE: 98.1 F | SYSTOLIC BLOOD PRESSURE: 110 MMHG | WEIGHT: 159.2 LBS | DIASTOLIC BLOOD PRESSURE: 84 MMHG

## 2021-02-25 DIAGNOSIS — Z3A.37 37 WEEKS GESTATION OF PREGNANCY: Primary | ICD-10-CM

## 2021-02-25 DIAGNOSIS — Z34.03 SUPERVISION OF NORMAL FIRST PREGNANCY IN THIRD TRIMESTER: ICD-10-CM

## 2021-02-25 PROCEDURE — 0502F SUBSEQUENT PRENATAL CARE: CPT | Performed by: NURSE PRACTITIONER

## 2021-02-25 NOTE — PROGRESS NOTES
+FM, -Ctx, -LOF, -VB  Patient Active Problem List   Diagnosis    Irritable bowel syndrome    Echogenic focus of bowel, fetal, affecting care of mother, antepartum    Placenta previa (RESOLVED 12/18/20)    Pregnancy affected by fetal growth restriction     Blood pressure 110/84, temperature 98.1 °F (36.7 °C), weight 159 lb 3.2 oz (72.2 kg), last menstrual period 06/07/2020, not currently breastfeeding.   Reviewed kick counts, labor and pre eclampsia precautions  Feeling well  Good FM  Has IOL set up 3.8 for AC<3%  Has growth scan next week  Discussed pain management options  Has breast pump  Strongly considering Mirena for contraception  GBS negative

## 2021-02-25 NOTE — PATIENT INSTRUCTIONS
Patient Education        Week 37 of Your Pregnancy: Care Instructions  Your Care Instructions     You are near the end of your pregnancy--and you're probably pretty uncomfortable. It may be harder to walk around. Lying down probably isn't comfortable either. You may have trouble getting to sleep or staying asleep. Most women deliver their babies between 40 and 41 weeks. This is a good time to think about packing a bag for the hospital with items you'll need. Then you'll be ready when labor starts. Follow-up care is a key part of your treatment and safety. Be sure to make and go to all appointments, and call your doctor if you are having problems. It's also a good idea to know your test results and keep a list of the medicines you take. How can you care for yourself at home? Learn about breastfeeding  · Breastfeeding is best for your baby and good for you. · Breast milk has antibodies to help your baby fight infections. · Mothers who breastfeed often lose weight faster, because making milk burns calories. · Learning the best ways to hold your baby will make breastfeeding easier. · Let your partner bathe and diaper the baby to keep your partner from feeling left out. Snuggle together when you breastfeed. · You may want to learn how to use a breast pump and store your milk. · If you choose to bottle feed, make the feeding feel like breastfeeding so you can bond with your baby. Always hold your baby and the bottle. Do not prop bottles or let your baby fall asleep with a bottle. Learn about crying  · It is common for babies to cry for 1 to 3 hours a day. Some cry more, some cry less. · Babies don't cry to make you upset or because you are a bad parent. · Crying is how your baby communicates. Your baby may be hungry; have gas; need a diaper change; or feel cold, warm, tired, lonely, or tense. Sometimes babies cry for unknown reasons.   · If you respond to your baby's needs, he or she will learn to trust you.  · Try to stay calm when your baby cries. Your baby may get more upset if he or she senses that you are upset. Know how to care for your   · Your baby's umbilical cord stump will drop off on its own, usually between 1 and 2 weeks. To care for your baby's umbilical cord area:  ? Clean the area at the bottom of the cord 2 or 3 times a day. ? Pay special attention to the area where the cord attaches to the skin. ? Keep the diaper folded below the cord. ? Use a damp washcloth or cotton ball to sponge bathe your baby until the stump has come off. · Your baby's first dark stool is called meconium. After the meconium is passed, your baby will develop his or her own bowel pattern. ? Some babies, especially  babies, have several bowel movements a day. Others have one or two a day, or one every 2 to 3 days. ?  babies often have loose, yellow stools. Formula-fed babies have more formed stools. ? If your baby's stools look like little pellets, he or she is constipated. After 2 days of constipation, call your baby's doctor. · If your baby will be circumcised, you can care for him at home. ? Gently rinse his penis with warm water after every diaper change. Do not try to remove the film that forms on the penis. This film will go away on its own. Pat dry. ? Put petroleum ointment, such as Vaseline, on the area of the diaper that will touch your baby's penis. This will keep the diaper from sticking to your baby. ? Ask the doctor about giving your baby acetaminophen (Tylenol) for pain. Where can you learn more? Go to https://chharlaneb.healthUro [email or web address] org and sign in to your PPT Reasearch account. Enter 68 [id number] in the [employer]unyu box to learn more about \"Week 37 of Your Pregnancy: Care Instructions. \"     If you do not have an account, please click on the \"Sign Up Now\" link. Current as of: 2020               Content Version: 12.6  © 2997-6646 [employer]. in your health, and be sure to contact your doctor if you have any problems. Where can you learn more? Go to [email or web address]://chpepiceweb.[email or web address] and sign in to your EuroSite Power account. Enter A058 in the AirSense Wireless box to learn more about \"Counting Your Baby's Kicks: Care Instructions. \"     If you do not have an account, please click on the \"Sign Up Now\" link. Current as of: February 11, 2020               Content Version: 12.6  © 7812-5299 [employer]. Care instructions adapted under license by [facility] ([facility]). If you have questions about a medical condition or this instruction, always ask your healthcare professional. [address] any warranty or liability for your use of this information. Patient Education        Learning About When to Call Your Doctor During Pregnancy (After 20 Weeks)  Your Care Instructions  It's common to have concerns about what might be a problem during pregnancy. Although most pregnant women don't have any serious problems, it's important to know when to call your doctor if you have certain symptoms or signs of labor. These are general suggestions. Your doctor may give you some more information about when to call. When to call your doctor (after 20 weeks)  Call 911 anytime you think you may need emergency care. For example, call if:  · You have severe vaginal bleeding. · You have sudden, severe pain in your belly. · You passed out (lost consciousness). · You have a seizure. · You see or feel the umbilical cord. · You think you are about to deliver your baby and can't make it safely to the hospital.  Call your doctor now or seek immediate medical care if:  · You have vaginal bleeding. · You have belly pain. · You have a fever. · You have symptoms of preeclampsia, such as:  ? Sudden swelling of your face, hands, or feet. ? New vision problems (such as dimness, blurring, or seeing spots). ? A severe headache.   · You have a sudden release of fluid from your vagina. (You think your water broke.)  · You think that you may be in labor. This means that you've had at least 6 contractions in an hour. · You notice that your baby has stopped moving or is moving much less than normal.  · You have symptoms of a urinary tract infection. These may include:  ? Pain or burning when you urinate. ? A frequent need to urinate without being able to pass much urine. ? Pain in the flank, which is just below the rib cage and above the waist on either side of the back. ? Blood in your urine. Watch closely for changes in your health, and be sure to contact your doctor if:  · You have vaginal discharge that smells bad. · You have skin changes, such as:  ? A rash. ? Itching. ? Yellow color to your skin. · You have other concerns about your pregnancy. If you have labor signs at 37 weeks or more  If you have signs of labor at 37 weeks or more, your doctor may tell you to call when your labor becomes more active. Symptoms of active labor include:  · Contractions that are regular. · Contractions that are less than 5 minutes apart. · Contractions that are hard to talk through. Follow-up care is a key part of your treatment and safety. Be sure to make and go to all appointments, and call your doctor if you are having problems. It's also a good idea to know your test results and keep a list of the medicines you take. Where can you learn more? Go to https://Symwaveximena.[email or web address] org and sign in to your KoolSpan account. Enter  in the [facility] box to learn more about \"Learning About When to Call Your Doctor During Pregnancy (After 20 Weeks). \"     If you do not have an account, please click on the \"Sign Up Now\" link. Current as of: February 11, 2020               Content Version: 12.6  © 9261-5481 [employer]. Care instructions adapted under license by [facility] [facility]).  If you have questions about a medical condition or this instruction, always ask your healthcare professional. [place] [address] any warranty or liability for your use of this information. Patient Education        Pain Relief During Labor: Care Instructions  Your Care Instructions     You can choose from a few types of pain relief for childbirth. These include:  · Medicine. Your doctor may offer different types of pain medicine while you are in labor. · Breathing techniques. · Comfort measures. This is often called natural childbirth. You also can use a combination of these choices. You can write down your choices for pain relief in a birth plan. A birth plan is a list of what you want during labor. Your personal needs are important when you make this choice. The right choice is the one that feels right to you. Every labor is different. Some women go into labor planning to have natural childbirth and later find that they need pain relief. Plan for what you want. But be aware that you may change your mind during labor. Follow-up care is a key part of your treatment and safety. Be sure to make and go to all appointments, and call your doctor if you are having problems. It's also a good idea to know your test results and keep a list of the medicines you take. What medicines can you use for pain relief? If you decide to take medicine to help your pain during labor, here are some medicines that may be used. Local anesthesia. You get a shot of medicine to numb the area if your doctor needs to make a cut to widen the vaginal opening. Regional anesthesia. A doctor can inject medicine into a space around the spinal cord. This is called an epidural. It can be used during labor to numb your lower body. But lack of feeling sometimes makes it harder to push. A spinal block is an injection of pain medicine into the spinal fluid. It quickly and fully numbs the pelvic area.  In some cases, a doctor may combine a spinal block with an epidural.  Opioids. These are pain relievers given through a vein or as a shot in the muscle. They include nalbuphine (Nubain), butorphanol (Stadol), and fentanyl. They can ease anxiety and pain. But they don't stop pain completely. Usually they aren't used right before delivery because they can slow the baby's breathing. What comfort measures can you use for pain relief? · Breathing techniques: Breathing in a rhythm can distract you from pain. Childbirth classes can teach you how to do focused breathing. · Distraction: You can walk, play cards, listen to music, watch TV, take a shower, or read. These can help take your mind off your contractions. · Massage: Your birth partner can massage your shoulders and lower back during contractions. Strong massage of the back muscles during contractions may reduce back labor pain. · Imagery: You can imagine a peaceful place. For instance, you can think of contractions as waves rolling over you. When should you call for help? Watch closely for changes in your health, and be sure to contact your doctor if:    · You want to learn more about pain relief. Where can you learn more? Go to https://chpepiceweb.[email or web address] org and sign in to your Text A Cab account. Enter Joaquín Grammes in the Solaria box to learn more about \"Pain Relief During Labor: Care Instructions. \"     If you do not have an account, please click on the \"Sign Up Now\" link. Current as of: February 11, 2020               Content Version: 12.6  © 2006-2020 [employer]. Care instructions adapted under license by [facility] ([facility]). If you have questions about a medical condition or this instruction, always ask your healthcare professional. [place] [address] any warranty or liability for your use of this information.

## 2021-03-03 ENCOUNTER — ROUTINE PRENATAL (OUTPATIENT)
Dept: PERINATAL CARE | Age: 26
End: 2021-03-03
Payer: COMMERCIAL

## 2021-03-03 VITALS
WEIGHT: 159.5 LBS | SYSTOLIC BLOOD PRESSURE: 107 MMHG | HEIGHT: 66 IN | HEART RATE: 92 BPM | TEMPERATURE: 97 F | RESPIRATION RATE: 16 BRPM | DIASTOLIC BLOOD PRESSURE: 65 MMHG | BODY MASS INDEX: 25.63 KG/M2

## 2021-03-03 DIAGNOSIS — O36.5990 POOR FETAL GROWTH AFFECTING PREGNANCY, ANTEPARTUM, SINGLE OR UNSPECIFIED FETUS: Primary | ICD-10-CM

## 2021-03-03 DIAGNOSIS — Z3A.38 38 WEEKS GESTATION OF PREGNANCY: ICD-10-CM

## 2021-03-03 DIAGNOSIS — Z36.4 ANTENATAL SCREENING FOR FETAL GROWTH RETARDATION USING ULTRASONICS: ICD-10-CM

## 2021-03-03 DIAGNOSIS — O35.DXX0 ECHOGENIC FOCUS OF BOWEL OF FETUS AFFECTING ANTEPARTUM CARE OF MOTHER, SINGLE OR UNSPECIFIED FETUS: ICD-10-CM

## 2021-03-03 PROCEDURE — 76821 MIDDLE CEREBRAL ARTERY ECHO: CPT | Performed by: OBSTETRICS & GYNECOLOGY

## 2021-03-03 PROCEDURE — 76816 OB US FOLLOW-UP PER FETUS: CPT | Performed by: OBSTETRICS & GYNECOLOGY

## 2021-03-03 PROCEDURE — 76820 UMBILICAL ARTERY ECHO: CPT | Performed by: OBSTETRICS & GYNECOLOGY

## 2021-03-03 PROCEDURE — 76819 FETAL BIOPHYS PROFIL W/O NST: CPT | Performed by: OBSTETRICS & GYNECOLOGY

## 2021-03-03 PROCEDURE — 99999 PR OFFICE/OUTPT VISIT,PROCEDURE ONLY: CPT | Performed by: OBSTETRICS & GYNECOLOGY

## 2021-03-04 ENCOUNTER — HOSPITAL ENCOUNTER (OUTPATIENT)
Dept: LAB | Age: 26
Setting detail: SPECIMEN
Discharge: HOME | End: 2021-03-04
Payer: COMMERCIAL

## 2021-03-04 ENCOUNTER — ROUTINE PRENATAL (OUTPATIENT)
Dept: OBGYN CLINIC | Age: 26
End: 2021-03-04

## 2021-03-04 VITALS
SYSTOLIC BLOOD PRESSURE: 122 MMHG | DIASTOLIC BLOOD PRESSURE: 74 MMHG | WEIGHT: 160 LBS | HEIGHT: 66 IN | TEMPERATURE: 98.3 F | BODY MASS INDEX: 25.71 KG/M2

## 2021-03-04 DIAGNOSIS — Z3A.38 38 WEEKS GESTATION OF PREGNANCY: Primary | ICD-10-CM

## 2021-03-04 DIAGNOSIS — Z01.818 PREOP TESTING: Primary | ICD-10-CM

## 2021-03-04 LAB
SARS-COV-2: NORMAL
SARS-COV-2: NOT DETECTED
SOURCE: NORMAL

## 2021-03-04 PROCEDURE — 0502F SUBSEQUENT PRENATAL CARE: CPT | Performed by: STUDENT IN AN ORGANIZED HEALTH CARE EDUCATION/TRAINING PROGRAM

## 2021-03-04 PROCEDURE — U0005 INFEC AGEN DETEC AMPLI PROBE: HCPCS

## 2021-03-04 PROCEDURE — U0003 INFECTIOUS AGENT DETECTION BY NUCLEIC ACID (DNA OR RNA); SEVERE ACUTE RESPIRATORY SYNDROME CORONAVIRUS 2 (SARS-COV-2) (CORONAVIRUS DISEASE [COVID-19]), AMPLIFIED PROBE TECHNIQUE, MAKING USE OF HIGH THROUGHPUT TECHNOLOGIES AS DESCRIBED BY CMS-2020-01-R: HCPCS

## 2021-03-04 NOTE — PROGRESS NOTES
Prenatal Visit    Ramon Hinojosa is a 27 y.o. female  at 43w4d    The patient was seen and evaluated. Reports positive fetal movements. She denies headache, vision changes, RUQ pain, contractions, vaginal bleeding and leakage of fluid. The patient was instructed on fetal kick counts and was given a kick sheet to complete every 8 hours. She was instructed that the baby should move at a minimum of ten times within one hour after a meal. The patient was instructed to lay down on her left side twenty minutes after eating and count movements for up to one hour with a target value of ten movements. She was instructed to notify the office if she did not make that target after two attempts or if after any attempt there was less than four movements. The patient admits to that the targets have been made. The patient already received the T-Dap Vaccine (27-36 weeks) this pregnancy. The patient already received the influenza vaccine this year. The problem list reflects the active issues addressed during today's visit. Allergies:  Patient has no known allergies. Vitals:    BP: 122/74  Weight: 160 lb (72.6 kg)  Fundal Height (cm): 37 cm  Fetal Heart Rate: 150  Movement: Present     Physical Exam:  SVE: N/A    Labs:  Group Beta Strep collection was done. Sensitivities for clindamycin and erythromycin were not ordered. The patient was found to be GBS: negative    Assessment & Plan:  Ramon Hinojosa is a 27 y.o. female  at 43w4d   - IOL set up for 3/8/21 @ 0700 at [address] test today. - Labor and kick count precautions given. - Signs and symptoms of preeclampsia reviewed. Patient Active Problem List    Diagnosis Date Noted    Pregnancy affected by fetal growth restriction 2021     [facility] 21: EFW 2051 g (Vito Sciara), 19%ile, AC <3%ile, dopplers normal. Plan for delivery between 38/0-39/0 based on ACOG PB #227.       Placenta previa (RESOLVED 20) 12/21/2020    Echogenic focus of bowel, fetal, affecting care of mother, antepartum 11/13/2020     Mayte low risk 11/17/20      Irritable bowel syndrome 07/24/2019     Return in about 3 weeks (around 3/25/2021) for PP Visit.     Ellen Doan Koi [facility] Ob/Gyn   3/4/2021, 9:09 AM

## 2021-03-05 ENCOUNTER — TELEPHONE (OUTPATIENT)
Dept: PRIMARY CARE CLINIC | Age: 26
End: 2021-03-05

## 2021-03-08 ENCOUNTER — HOSPITAL ENCOUNTER (INPATIENT)
Age: 26
LOS: 2 days | Discharge: HOME | End: 2021-03-10
Attending: OBSTETRICS & GYNECOLOGY | Admitting: OBSTETRICS & GYNECOLOGY
Payer: COMMERCIAL

## 2021-03-08 ENCOUNTER — APPOINTMENT (OUTPATIENT)
Dept: LABOR AND DELIVERY | Age: 26
End: 2021-03-08
Payer: COMMERCIAL

## 2021-03-08 PROBLEM — Z82.79 FAMILY HISTORY OF NEUROFIBROMATOSIS: Status: ACTIVE | Noted: 2021-03-08

## 2021-03-08 LAB
ABO/RH: NORMAL
ABSOLUTE EOS #: 0.04 K/UL (ref 0–0.44)
ABSOLUTE IMMATURE GRANULOCYTE: 0.03 K/UL (ref 0–0.3)
ABSOLUTE LYMPH #: 1.44 K/UL (ref 1.1–3.7)
ABSOLUTE MONO #: 0.6 K/UL (ref 0.1–1.2)
AMPHETAMINE SCREEN URINE: NEGATIVE
ANTIBODY SCREEN: NEGATIVE
ARM BAND NUMBER: NORMAL
BARBITURATE SCREEN URINE: NEGATIVE
BASOPHILS # BLD: 0 % (ref 0–2)
BASOPHILS ABSOLUTE: <0.03 K/UL (ref 0–0.2)
BENZODIAZEPINE SCREEN, URINE: NEGATIVE
BUPRENORPHINE URINE: NORMAL
CANNABINOID SCREEN URINE: NEGATIVE
COCAINE METABOLITE, URINE: NEGATIVE
DIFFERENTIAL TYPE: ABNORMAL
EOSINOPHILS RELATIVE PERCENT: 1 % (ref 1–4)
EXPIRATION DATE: NORMAL
HCT VFR BLD CALC: 32.4 % (ref 36.3–47.1)
HEMOGLOBIN: 10.7 G/DL (ref 11.9–15.1)
IMMATURE GRANULOCYTES: 0 %
LYMPHOCYTES # BLD: 19 % (ref 24–43)
MCH RBC QN AUTO: 29.6 PG (ref 25.2–33.5)
MCHC RBC AUTO-ENTMCNC: 33 G/DL (ref 28.4–34.8)
MCV RBC AUTO: 89.8 FL (ref 82.6–102.9)
MDMA URINE: NORMAL
METHADONE SCREEN, URINE: NEGATIVE
METHAMPHETAMINE, URINE: NORMAL
MONOCYTES # BLD: 8 % (ref 3–12)
NRBC AUTOMATED: 0 PER 100 WBC
OPIATES, URINE: NEGATIVE
OXYCODONE SCREEN URINE: NEGATIVE
PDW BLD-RTO: 13.1 % (ref 11.8–14.4)
PHENCYCLIDINE, URINE: NEGATIVE
PLATELET # BLD: 206 K/UL (ref 138–453)
PLATELET ESTIMATE: ABNORMAL
PMV BLD AUTO: 10.1 FL (ref 8.1–13.5)
PROPOXYPHENE, URINE: NORMAL
RBC # BLD: 3.61 M/UL (ref 3.95–5.11)
RBC # BLD: ABNORMAL 10*6/UL
SEG NEUTROPHILS: 72 % (ref 36–65)
SEGMENTED NEUTROPHILS ABSOLUTE COUNT: 5.61 K/UL (ref 1.5–8.1)
T. PALLIDUM, IGG: NONREACTIVE
TEST INFORMATION: NORMAL
TRICYCLIC ANTIDEPRESSANTS, UR: NORMAL
WBC # BLD: 7.7 K/UL (ref 3.5–11.3)
WBC # BLD: ABNORMAL 10*3/UL

## 2021-03-08 PROCEDURE — 86780 TREPONEMA PALLIDUM: CPT

## 2021-03-08 PROCEDURE — 3E033VJ INTRODUCTION OF OTHER HORMONE INTO PERIPHERAL VEIN, PERCUTANEOUS APPROACH: ICD-10-PCS | Performed by: OBSTETRICS & GYNECOLOGY

## 2021-03-08 PROCEDURE — 85025 COMPLETE CBC W/AUTO DIFF WBC: CPT

## 2021-03-08 PROCEDURE — 86901 BLOOD TYPING SEROLOGIC RH(D): CPT

## 2021-03-08 PROCEDURE — 1220000000 HC SEMI PRIVATE OB R&B

## 2021-03-08 PROCEDURE — 80307 DRUG TEST PRSMV CHEM ANLYZR: CPT

## 2021-03-08 PROCEDURE — 10907ZC DRAINAGE OF AMNIOTIC FLUID, THERAPEUTIC FROM PRODUCTS OF CONCEPTION, VIA NATURAL OR ARTIFICIAL OPENING: ICD-10-PCS | Performed by: OBSTETRICS & GYNECOLOGY

## 2021-03-08 PROCEDURE — 2580000003 HC RX 258: Performed by: STUDENT IN AN ORGANIZED HEALTH CARE EDUCATION/TRAINING PROGRAM

## 2021-03-08 PROCEDURE — 86850 RBC ANTIBODY SCREEN: CPT

## 2021-03-08 PROCEDURE — 6370000000 HC RX 637 (ALT 250 FOR IP): Performed by: STUDENT IN AN ORGANIZED HEALTH CARE EDUCATION/TRAINING PROGRAM

## 2021-03-08 PROCEDURE — 96372 THER/PROPH/DIAG INJ SC/IM: CPT

## 2021-03-08 PROCEDURE — 6360000002 HC RX W HCPCS: Performed by: STUDENT IN AN ORGANIZED HEALTH CARE EDUCATION/TRAINING PROGRAM

## 2021-03-08 PROCEDURE — 3E0P7VZ INTRODUCTION OF HORMONE INTO FEMALE REPRODUCTIVE, VIA NATURAL OR ARTIFICIAL OPENING: ICD-10-PCS | Performed by: OBSTETRICS & GYNECOLOGY

## 2021-03-08 PROCEDURE — 86900 BLOOD TYPING SEROLOGIC ABO: CPT

## 2021-03-08 PROCEDURE — 59200 INSERT CERVICAL DILATOR: CPT

## 2021-03-08 RX ORDER — PROMETHAZINE HYDROCHLORIDE 25 MG/ML
25 INJECTION, SOLUTION INTRAMUSCULAR; INTRAVENOUS ONCE
Status: COMPLETED | OUTPATIENT
Start: 2021-03-08 | End: 2021-03-08

## 2021-03-08 RX ORDER — ONDANSETRON 2 MG/ML
4 INJECTION INTRAMUSCULAR; INTRAVENOUS EVERY 6 HOURS PRN
Status: DISCONTINUED | OUTPATIENT
Start: 2021-03-08 | End: 2021-03-09

## 2021-03-08 RX ORDER — SODIUM CHLORIDE, SODIUM LACTATE, POTASSIUM CHLORIDE, CALCIUM CHLORIDE 600; 310; 30; 20 MG/100ML; MG/100ML; MG/100ML; MG/100ML
INJECTION, SOLUTION INTRAVENOUS CONTINUOUS
Status: DISCONTINUED | OUTPATIENT
Start: 2021-03-08 | End: 2021-03-09

## 2021-03-08 RX ORDER — DIPHENHYDRAMINE HYDROCHLORIDE 50 MG/ML
25 INJECTION INTRAMUSCULAR; INTRAVENOUS EVERY 6 HOURS PRN
Status: DISCONTINUED | OUTPATIENT
Start: 2021-03-08 | End: 2021-03-09

## 2021-03-08 RX ORDER — DIPHENHYDRAMINE HCL 25 MG
25 TABLET ORAL EVERY 4 HOURS PRN
Status: DISCONTINUED | OUTPATIENT
Start: 2021-03-08 | End: 2021-03-09

## 2021-03-08 RX ORDER — ACETAMINOPHEN 500 MG
1000 TABLET ORAL EVERY 6 HOURS PRN
Status: DISCONTINUED | OUTPATIENT
Start: 2021-03-08 | End: 2021-03-09

## 2021-03-08 RX ORDER — MORPHINE SULFATE 10 MG/ML
10 INJECTION, SOLUTION INTRAMUSCULAR; INTRAVENOUS ONCE
Status: COMPLETED | OUTPATIENT
Start: 2021-03-08 | End: 2021-03-08

## 2021-03-08 RX ADMIN — MORPHINE SULFATE 10 MG: 10 INJECTION INTRAVENOUS at 21:20

## 2021-03-08 RX ADMIN — SODIUM CHLORIDE, POTASSIUM CHLORIDE, SODIUM LACTATE AND CALCIUM CHLORIDE: 600; 310; 30; 20 INJECTION, SOLUTION INTRAVENOUS at 07:40

## 2021-03-08 RX ADMIN — Medication 50 MCG: at 15:15

## 2021-03-08 RX ADMIN — PROMETHAZINE HYDROCHLORIDE 25 MG: 25 INJECTION INTRAMUSCULAR; INTRAVENOUS at 21:20

## 2021-03-08 RX ADMIN — Medication 25 MCG: at 09:40

## 2021-03-08 RX ADMIN — SODIUM CHLORIDE, POTASSIUM CHLORIDE, SODIUM LACTATE AND CALCIUM CHLORIDE: 600; 310; 30; 20 INJECTION, SOLUTION INTRAVENOUS at 15:22

## 2021-03-08 RX ADMIN — DIPHENHYDRAMINE HYDROCHLORIDE 25 MG: 50 INJECTION, SOLUTION INTRAMUSCULAR; INTRAVENOUS at 17:14

## 2021-03-08 NOTE — FLOWSHEET NOTE
Pt arrived to labor and delivery for scheduled induction of labor. Pt denies any vaginal bleeding,discharge.  Positive fetal movement

## 2021-03-08 NOTE — H&P
OBSTETRICAL HISTORY Shay Coleman    Date: 3/8/2021       Time: 6:49 AM   Patient Name: Alex Brown     Patient : 1995  Room/Bed: 6571/9657-89    Admission Date/Time: 3/8/2021  6:43 AM      CC: IOL 2/2 IUGR     HPI: Raul Encarnacion is a 27 y.o.  at 38w5d who presents for scheduled IOL 2/2 IUGR due to [facility] <10%ile. She denies any complaints at this time. Most recent MFM scan on 3/3/21 demonstrated AC of 6%ile with EFW of 6lb 7oz. The patient reports fetal movement is present, denies contractions, denies loss of fluid, denies vaginal bleeding. She denies fever, chills, headache, change in vision, RUQ pain, N/V, urinary symptoms or change in vaginal discharge. Pregnancy is complicated by echogenic fetal bowel, FHx neurofibromatosis, BMI 25. DATING:  LMP: Patient's last menstrual period was 2020.   Estimated Date of Delivery: 3/17/21   Based on: early ultrasound, at 15 2/7 weeks GA    PREGNANCY RISK FACTORS:  Patient Active Problem List   Diagnosis    Irritable bowel syndrome    Echogenic focus of bowel, fetal, affecting care of mother, antepartum    Placenta previa (RESOLVED 20)    Pregnancy affected by fetal growth restriction        Steroids Given In This Pregnancy:  no     REVIEW OF SYSTEMS:   Constitutional: negative fever, negative chills, negative weight changes   HEENT: negative visual disturbances, negative headaches, negative dizziness, negative hearing loss  Breast: Negative breast abnormalities, negative breast lumps, negative nipple discharge  Respiratory: negative dyspnea, negative cough, negative SOB  Cardiovascular: negative chest pain,  negative palpitations, negative arrhythmia, negative syncope   Gastrointestinal: negative abdominal pain, negative RUQ pain, negative N/V, negative diarrhea, negative constipation, negative bowel changes, negative heartburn   Genitourinary: negative dysuria, negative hematuria, negative urinary incontinence, negative vaginal discharge, negative vaginal bleeding or spotting  Dermatological: negative rash, negative pruritis, negative mole or other skin changes  Hematologic: negative bruising  Immunologic/Lymphatic: negative recent illness, negative recent sick contact  Musculoskeletal: negative back pain, negative myalgias, negative arthralgias  Neurological:  negative dizziness, negative migraines, negative seizures, negative weakness  Behavior/Psych: negative depression, negative anxiety, negative SI, negative HI    OBSTETRICAL HISTORY:   OB History    Para Term  AB Living   1 0 0 0 0 0   SAB TAB Ectopic Molar Multiple Live Births   0 0 0 0 0 0      # Outcome Date GA Lbr Ramses/2nd Weight Sex Delivery Anes PTL Lv   1 Current                PAST MEDICAL HISTORY:   has a past medical history of Abnormal Pap smear of cervix, Anxiety, and Mental disorder. PAST SURGICAL HISTORY:   has a past surgical history that includes [place]er tooth extraction. ALLERGIES:  has No Known Allergies. MEDICATIONS:  Prior to Admission medications    Medication Sig Start Date End Date Taking?  Authorizing Provider   Phenylephrine-Acetaminophen 5-325 MG TABS Take by mouth    Historical Provider, MD   docusate sodium (COLACE) 100 MG capsule Take 100 mg by mouth 2 times daily    Historical Provider, MD   Ferrous Sulfate (IRON PO) Take by mouth Every other day    Historical Provider, MD   Prenatal Vit-Fe Fumarate-FA (PNV PRENATAL PLUS MULTIVITAMIN) 27-1 MG TABS TAKE 1 TABLET BY MOUTH ONE TIME A DAY 20   Historical Provider, MD   Doxylamine Succinate, Sleep, (UNISOM PO) Take by mouth    Historical Provider, MD   Pyridoxine HCl (VITAMIN B-6 PO) Take by mouth    Historical Provider, MD       FAMILY HISTORY:  family history includes Heart Disease in her maternal grandmother; No Known Problems in her father, mother, paternal grandfather, and paternal grandmother; Stroke in her maternal grandfather. SOCIAL HISTORY:   reports that she has never smoked. She has never used smokeless tobacco. She reports previous alcohol use. She reports that she does not use drugs.     VITALS:  Vitals:    03/08/21 0655 03/08/21 0715   BP: 114/67    Pulse: 87    Resp: 16    Temp: 98.6 °F (37 °C)    TempSrc: Oral    Weight:  160 lb (72.6 kg)   Height:  5' 6\" (1.676 m)        PHYSICAL EXAM:  Fetal Heart Monitor:  Baseline Heart Rate 140, moderate variability, present accelerations, isolated variable deceleration with spontaneous resolution  [place]: contractions, rare    General appearance:  no apparent distress, alert and cooperative  HEENT: head atraumatic, normocephalic, moist mucous membranes, trachea midline  Neurologic:  alert, oriented, normal speech, no focal findings or movement disorder noted  Lungs:  No increased work of breathing, good air exchange, clear to auscultation bilaterally, no crackles or wheezing  Heart:  regular rate and rhythm and no murmur, rubs, gallops  Abdomen:  soft, gravid, non-tender, no rebound, guarding, or rigidity, no RUQ or epigastric tenderness, no signs or symptoms of abruption, no signs or symptoms of chorioamnionitis  Extremities:  no calf tenderness, non edematous, no varicosities, full range of motion in all four extremities  Musculoskeletal: Gross strength equal and intact throughout, no gross abnormalities, range of motion normal in hips, knees, shoulders and spine, CVA tenderness: none  Psychiatric: Mood appropriate, normal affect   Rectal Exam: not indicated  Pelvic Exam:   Chaperone for Intimate Exam: Chaperone was present for entire exam, Chaperone Name: Veronica Solomon RN  Sterile Vaginal Exam:  Cervix: No cervical motion tenderness   Uterus: Is gravid, Normal size, shape, consistency and non-tender   Adnexa: Non-tender, no palpable masses  Cervix: 1 cm dilated, 30 % effaced, -2 station, posterior position (out of 3 station), medium consistency, FETAL POSITION: Cephalic (confirmed by ultrasound), Membranes intact,    Bishops Score: 3     0 1 2 3   Position Posterior Intermediate Anterior -   Consistency Firm Intermediate Soft -   Effacement 0-30% 31-50% 51-80% >80%   Dilation 0cm 1-2cm 3-4cm >5cm   Fetal Station -3 -2 -1, 0 +1, +2         LIMITED BEDSIDE US:  Position: Cephalic  Placental Location: anterior  Fetal Heart Tones: Present  Fetal Movement: Present  Amniotic Fluid Index/Volume: adequate 2x2 cm fluid pocket  Estimated Fetal Weight:  6 lbs 9 oz    PRENATAL LAB RESULTS:   Blood Type/Rh: B pos  Antibody Screen: negative  Hemoglobin, Hematocrit, Platelets: 78.6 / 14.0 / 247  Rubella: immune  T.  Pallidum, IgG: non-reactive   Hepatitis B Surface Antigen: non-reactive   HIV: non-reactive   Sickle Cell Screen: not done  Gonorrhea: negative  Chlamydia: negative  Urine culture: negative, date: 9/3/20    1 hour Glucose Tolerance Test: 87    Group B Strep: negative  Cystic Fibrosis Screen: negative  First Trimester Screen: not available  Multiple Markers: normal  Non-Invasive Prenatal Testing: no aneuploidy detected  Anatomy US: echogenic fetal bowel otherwise wnl, anterior placenta-low lying, 3VC, normal insertion    ASSESSMENT & PLAN:  Leni Willard is a 27 y.o. female  at 44w7d IOL 2/2 IUGR   - GBS negative / Rh positive / R immune   - No indication for GBS prophylaxis   - VSS, afebrile   - ATSO: Dr. Elidia Snell   - CBC, T&S, Tpal, UDS   - COVID neg 3/4/21   - Gen Diet   - IVF: LR @439FA/NW   - LBUS: Cephalic, EFW 6lb 9oz   - SVE: 2   - Induction Plan: Cytotec 25mcg PV   - Last MFM scan on 3/3/21 demonstrates AC 6%ile, total EFW 29%ile    Echogenic Fetal bowel   - Infectious workup complete   - Parvo IgG+, IgM neg   - NIPT wnl    FHx Neurofibromatosis   - Pt 1/2 sister   - Patient denies genetic/inherited component to her sister's disease    Placenta Previa   - Resolved    BMI 25.82    Patient Active Problem List    Diagnosis Date Noted    Pregnancy affected by

## 2021-03-08 NOTE — DISCHARGE SUMMARY
Obstetric Discharge Summary  [facility]    Patient Name: Kev Nunez  Patient : 1995  Primary Care Physician: Eun Maddox MD  Admit Date: 3/8/2021    Principal Diagnosis: IUP at 38w5d, admitted for IOL 2/2 IUGR     Her pregnancy has been complicated by:   Patient Active Problem List   Diagnosis    Irritable bowel syndrome    Echogenic focus of bowel, fetal, affecting care of mother, antepartum    Placenta previa (RESOLVED 20)    Pregnancy affected by fetal growth restriction    Family history of neurofibromatosis    38 weeks gestation of pregnancy     3/9/21 F APG  Wt 6#4       Infection Present?: No  Hospital Acquired: No    Surgical Operations & Procedures:  [x] Pitocin Induction of Labor  [] Pitocin Augmentation of Labor  [x] Prostaglandin Induction of Labor  [x] Mechanical Induction of Labor  [x] Artificial Rupture of Membranes  [] Intrauterine Pressure Catheter  [] Fetal Scalp Electrode  [] Amnioinfusion  Analgesia: epidural  Delivery Type: Spontaneous Vaginal Delivery: See Labor and Delivery Summary   Laceration(s): bilateral periurethral--hemostatic, 1st degree, repaired with 3-0 vicryl     Consultations: Anesthesia    Pertinent Findings & Procedures:   Kev Nunez is a 27 y.o. female  at 44w7d admitted for IOL 2/2 IUGR; received cytotec 25mcg PV x1, 50 mvg PO, thompson balloon, pitocin, AROM (clr), epidural.     She delivered by spontaneous vaginal a Live Born infant on 3/9/21. She lost IV access and received IM pitocin. She had some uterine atony and received methergine IM x1, which resolved. Information for the patient's :  Demecs, Baby Girl [facility] [[id number]]   female   Birth Weight: 6 lb 4.4 oz (2.845 kg)       Apgars: 8 at 1 minute and 9 at 5 minutes.      Postpartum course: normal.      Course of patient: uncomplicated    Discharge to: Home    Readmission planned: no     Recommendations on Discharge:     Medications: Medication List      START taking these medications    ibuprofen 800 MG tablet  Commonly known as: ADVIL;MOTRIN  Take 1 tablet by mouth every 8 hours as needed for Pain        CONTINUE taking these medications    docusate sodium 100 MG capsule  Commonly known as: COLACE  Take 1 capsule by mouth 2 times daily     IRON PO     Phenylephrine-Acetaminophen 5-325 MG Tabs     PNV Prenatal Plus Multivitamin 27-1 MG Tabs     UNISOM PO     VITAMIN B-6 PO           Where to Get Your Medications      You can get these medications from any pharmacy    Bring a paper prescription for each of these medications  · docusate sodium 100 MG capsule  · ibuprofen 800 MG tablet          Activity: pelvic rest x 6 weeks  Diet: regular diet  Follow up: 2 weeks     Condition on discharge: stable    Discharge date: 3/10/21    Tushar Valencia DO  Ob/Gyn Resident    Comments:  Home care and follow-up care were reviewed. Pelvic rest, and birth control were reviewed. Signs and symptoms of mastitis and post partum depression were reviewed. The patient is to notify her physician if any of these occur. The patient was counseled on secondary smoke risks and the increased risk of sudden infant death syndrome and respiratory problems to her baby with exposure. She was counseled on various alternate recommendations to decrease the exposure to secondary smoke to her children.

## 2021-03-09 ENCOUNTER — ANESTHESIA (OUTPATIENT)
Dept: LABOR AND DELIVERY | Age: 26
End: 2021-03-09
Payer: COMMERCIAL

## 2021-03-09 ENCOUNTER — ANESTHESIA EVENT (OUTPATIENT)
Dept: LABOR AND DELIVERY | Age: 26
End: 2021-03-09
Payer: COMMERCIAL

## 2021-03-09 PROCEDURE — 6360000002 HC RX W HCPCS: Performed by: STUDENT IN AN ORGANIZED HEALTH CARE EDUCATION/TRAINING PROGRAM

## 2021-03-09 PROCEDURE — 6360000002 HC RX W HCPCS: Performed by: ANESTHESIOLOGY

## 2021-03-09 PROCEDURE — 2500000003 HC RX 250 WO HCPCS: Performed by: NURSE ANESTHETIST, CERTIFIED REGISTERED

## 2021-03-09 PROCEDURE — 96374 THER/PROPH/DIAG INJ IV PUSH: CPT

## 2021-03-09 PROCEDURE — 96372 THER/PROPH/DIAG INJ SC/IM: CPT

## 2021-03-09 PROCEDURE — 0UQMXZZ REPAIR VULVA, EXTERNAL APPROACH: ICD-10-PCS | Performed by: OBSTETRICS & GYNECOLOGY

## 2021-03-09 PROCEDURE — 59400 OBSTETRICAL CARE: CPT | Performed by: OBSTETRICS & GYNECOLOGY

## 2021-03-09 PROCEDURE — 1220000000 HC SEMI PRIVATE OB R&B

## 2021-03-09 PROCEDURE — 88307 TISSUE EXAM BY PATHOLOGIST: CPT

## 2021-03-09 PROCEDURE — 7200000001 HC VAGINAL DELIVERY

## 2021-03-09 PROCEDURE — 0HQ9XZZ REPAIR PERINEUM SKIN, EXTERNAL APPROACH: ICD-10-PCS | Performed by: OBSTETRICS & GYNECOLOGY

## 2021-03-09 PROCEDURE — 51701 INSERT BLADDER CATHETER: CPT

## 2021-03-09 PROCEDURE — 6370000000 HC RX 637 (ALT 250 FOR IP): Performed by: STUDENT IN AN ORGANIZED HEALTH CARE EDUCATION/TRAINING PROGRAM

## 2021-03-09 PROCEDURE — 3700000025 EPIDURAL BLOCK: Performed by: ANESTHESIOLOGY

## 2021-03-09 PROCEDURE — 6360000002 HC RX W HCPCS: Performed by: NURSE ANESTHETIST, CERTIFIED REGISTERED

## 2021-03-09 RX ORDER — HYDROCORTISONE 25 MG/G
CREAM TOPICAL
Status: DISCONTINUED | OUTPATIENT
Start: 2021-03-09 | End: 2021-03-10 | Stop reason: HOSPADM

## 2021-03-09 RX ORDER — ACETAMINOPHEN 500 MG
1000 TABLET ORAL EVERY 6 HOURS PRN
Status: DISCONTINUED | OUTPATIENT
Start: 2021-03-09 | End: 2021-03-10 | Stop reason: HOSPADM

## 2021-03-09 RX ORDER — DOCUSATE SODIUM 100 MG/1
100 CAPSULE, LIQUID FILLED ORAL 2 TIMES DAILY
Status: DISCONTINUED | OUTPATIENT
Start: 2021-03-09 | End: 2021-03-10 | Stop reason: HOSPADM

## 2021-03-09 RX ORDER — IBUPROFEN 800 MG/1
800 TABLET ORAL EVERY 8 HOURS PRN
Status: DISCONTINUED | OUTPATIENT
Start: 2021-03-09 | End: 2021-03-10 | Stop reason: HOSPADM

## 2021-03-09 RX ORDER — IBUPROFEN 800 MG/1
800 TABLET ORAL EVERY 8 HOURS PRN
Qty: 30 TABLET | Refills: 0 | Status: SHIPPED | OUTPATIENT
Start: 2021-03-09

## 2021-03-09 RX ORDER — DOCUSATE SODIUM 100 MG/1
100 CAPSULE, LIQUID FILLED ORAL 2 TIMES DAILY
Qty: 30 CAPSULE | Refills: 1 | Status: SHIPPED | OUTPATIENT
Start: 2021-03-09 | End: 2022-04-13

## 2021-03-09 RX ORDER — ONDANSETRON 2 MG/ML
4 INJECTION INTRAMUSCULAR; INTRAVENOUS EVERY 6 HOURS PRN
Status: DISCONTINUED | OUTPATIENT
Start: 2021-03-09 | End: 2021-03-09

## 2021-03-09 RX ORDER — SIMETHICONE 80 MG
80 TABLET,CHEWABLE ORAL EVERY 6 HOURS PRN
Status: DISCONTINUED | OUTPATIENT
Start: 2021-03-09 | End: 2021-03-10 | Stop reason: HOSPADM

## 2021-03-09 RX ORDER — ROPIVACAINE HYDROCHLORIDE 2 MG/ML
INJECTION, SOLUTION EPIDURAL; INFILTRATION; PERINEURAL CONTINUOUS PRN
Status: DISCONTINUED | OUTPATIENT
Start: 2021-03-09 | End: 2021-03-09 | Stop reason: SDUPTHER

## 2021-03-09 RX ORDER — NALOXONE HYDROCHLORIDE 0.4 MG/ML
0.4 INJECTION, SOLUTION INTRAMUSCULAR; INTRAVENOUS; SUBCUTANEOUS PRN
Status: DISCONTINUED | OUTPATIENT
Start: 2021-03-09 | End: 2021-03-09

## 2021-03-09 RX ORDER — LANOLIN 72 %
OINTMENT (GRAM) TOPICAL PRN
Status: DISCONTINUED | OUTPATIENT
Start: 2021-03-09 | End: 2021-03-10 | Stop reason: HOSPADM

## 2021-03-09 RX ORDER — ROPIVACAINE HYDROCHLORIDE 2 MG/ML
INJECTION, SOLUTION EPIDURAL; INFILTRATION; PERINEURAL
Status: COMPLETED
Start: 2021-03-09 | End: 2021-03-09

## 2021-03-09 RX ORDER — BISACODYL 10 MG
10 SUPPOSITORY, RECTAL RECTAL DAILY PRN
Status: DISCONTINUED | OUTPATIENT
Start: 2021-03-09 | End: 2021-03-10 | Stop reason: HOSPADM

## 2021-03-09 RX ORDER — OXYTOCIN 10 [USP'U]/ML
10 INJECTION, SOLUTION INTRAMUSCULAR; INTRAVENOUS ONCE
Status: COMPLETED | OUTPATIENT
Start: 2021-03-09 | End: 2021-03-09

## 2021-03-09 RX ORDER — ONDANSETRON 2 MG/ML
4 INJECTION INTRAMUSCULAR; INTRAVENOUS EVERY 4 HOURS PRN
Status: DISCONTINUED | OUTPATIENT
Start: 2021-03-09 | End: 2021-03-10 | Stop reason: HOSPADM

## 2021-03-09 RX ORDER — ROPIVACAINE HYDROCHLORIDE 2 MG/ML
INJECTION, SOLUTION EPIDURAL; INFILTRATION; PERINEURAL PRN
Status: DISCONTINUED | OUTPATIENT
Start: 2021-03-09 | End: 2021-03-09 | Stop reason: SDUPTHER

## 2021-03-09 RX ORDER — NALBUPHINE HCL 10 MG/ML
5 AMPUL (ML) INJECTION EVERY 4 HOURS PRN
Status: DISCONTINUED | OUTPATIENT
Start: 2021-03-09 | End: 2021-03-09

## 2021-03-09 RX ORDER — BUPIVACAINE HYDROCHLORIDE 7.5 MG/ML
INJECTION, SOLUTION INTRASPINAL PRN
Status: DISCONTINUED | OUTPATIENT
Start: 2021-03-09 | End: 2021-03-09 | Stop reason: SDUPTHER

## 2021-03-09 RX ORDER — METHYLERGONOVINE MALEATE 0.2 MG/ML
200 INJECTION INTRAVENOUS ONCE
Status: COMPLETED | OUTPATIENT
Start: 2021-03-09 | End: 2021-03-09

## 2021-03-09 RX ADMIN — METHYLERGONOVINE MALEATE 200 MCG: 0.2 INJECTION, SOLUTION INTRAMUSCULAR; INTRAVENOUS at 14:45

## 2021-03-09 RX ADMIN — BUPIVACAINE HYDROCHLORIDE IN DEXTROSE 3 ML: 7.5 INJECTION, SOLUTION SUBARACHNOID at 07:33

## 2021-03-09 RX ADMIN — ACETAMINOPHEN 1000 MG: 500 TABLET ORAL at 20:20

## 2021-03-09 RX ADMIN — Medication 6 MILLI-UNITS/MIN: at 10:12

## 2021-03-09 RX ADMIN — ROPIVACAINE HYDROCHLORIDE 8 ML: 2 INJECTION, SOLUTION EPIDURAL; INFILTRATION at 07:38

## 2021-03-09 RX ADMIN — IBUPROFEN 800 MG: 800 TABLET, FILM COATED ORAL at 23:19

## 2021-03-09 RX ADMIN — Medication 87.3 MILLI-UNITS/MIN: at 15:00

## 2021-03-09 RX ADMIN — DOCUSATE SODIUM 100 MG: 100 CAPSULE, LIQUID FILLED ORAL at 20:14

## 2021-03-09 RX ADMIN — OXYTOCIN 10 UNITS: 10 INJECTION, SOLUTION INTRAMUSCULAR; INTRAVENOUS at 14:45

## 2021-03-09 RX ADMIN — Medication 1 MILLI-UNITS/MIN: at 00:00

## 2021-03-09 RX ADMIN — ONDANSETRON 4 MG: 2 INJECTION INTRAMUSCULAR; INTRAVENOUS at 09:08

## 2021-03-09 RX ADMIN — ROPIVACAINE HYDROCHLORIDE 10 ML/HR: 2 INJECTION, SOLUTION EPIDURAL; INFILTRATION at 07:38

## 2021-03-09 RX ADMIN — IBUPROFEN 800 MG: 800 TABLET, FILM COATED ORAL at 15:34

## 2021-03-09 RX ADMIN — BENZOCAINE AND LEVOMENTHOL: 200; 5 SPRAY TOPICAL at 18:47

## 2021-03-09 ASSESSMENT — PAIN SCALES - GENERAL
PAINLEVEL_OUTOF10: 3
PAINLEVEL_OUTOF10: 3

## 2021-03-09 NOTE — ANESTHESIA PRE PROCEDURE
Department of Anesthesiology  Preprocedure Note       Name:  Yvette Penaloza   Age:  27 y.o.  :  1995                                          MRN:  [id number]         Date:  3/9/2021      Surgeon: * No surgeons listed *    Department of Anesthesiology  Pre-Anesthesia Evaluation/Consultation         Name:  Yvette Penaloza                                         Age:  27 y.o.   MRN:  [id number]           Procedure (Scheduled):  Epidural  Surgeon:   Attending OB    Medications  Current Facility-Administered Medications   Medication Dose Route Frequency Provider Last Rate Last Admin    ropivacaine (NAROPIN) 0.2% injection 0.2%             lactated ringers infusion   Intravenous Continuous Gleda Vesta,  mL/hr at 21 1522 New Bag at 21 1522    ondansetron (ZOFRAN) injection 4 mg  4 mg Intravenous Q6H PRN Gleda Vesta, DO        diphenhydrAMINE (BENADRYL) tablet 25 mg  25 mg Oral Q4H PRN Gleda Vesta, DO        acetaminophen (TYLENOL) tablet 1,000 mg  1,000 mg Oral Q6H PRN Gleda Vesta, DO        diphenhydrAMINE (BENADRYL) injection 25 mg  25 mg Intravenous Q6H PRN Simon Glory, DO   25 mg at 21 1714    miSOPROStol (CYTOTEC) pre-split tablet TABS 50 mcg  50 mcg Oral Once Gleda Vesta, DO   Stopped at 21    oxytocin (PITOCIN) 30 units in 500 mL infusion  1 henrietta-units/min Intravenous Continuous Gleda Vesta, DO 4 mL/hr at 21 0402 4 henrietta-units/min at 21 0402       No Known Allergies  Patient Active Problem List   Diagnosis    Irritable bowel syndrome    Echogenic focus of bowel, fetal, affecting care of mother, antepartum    Placenta previa (RESOLVED 20)    Pregnancy affected by fetal growth restriction    Family history of neurofibromatosis    38 weeks gestation of pregnancy     Past Medical History:   Diagnosis Date    Abnormal Pap smear of cervix     Anxiety     Mental disorder     DELORES     Past Surgical History: Procedure Laterality Date    WISDOM TOOTH EXTRACTION       Social History     Tobacco Use    Smoking status: Never Smoker    Smokeless tobacco: Never Used   Substance Use Topics    Alcohol use: Not Currently     Comment: socially    Drug use: No         Vital Signs (Current)   Vitals:    21 0701   BP: 112/74   Pulse: 66   Resp: 16   Temp:      Vital Signs Statistics (for past 48 hrs)     Temp  Av.8 °C (98.2 °F)  Min: 36.7 °C (98 °F)   Min taken time: 21 0003  Max: 37 °C (98.6 °F)   Max taken time: 21 0655  Pulse  Av.5  Min: 61   Min taken time: 21 0300  Max: 80   Max taken time: 21 0655  Resp  Av.5  Min: 12   Min taken time: 21 0701  Max: 25   Max taken time: 21 0502  BP  Min: 80/55   Min taken time: 21 0402  Max: 116/76   Max taken time: 21 0003  BP Readings from Last 3 Encounters:   21 112/74   21 122/74   21 107/65       BMI  Body mass index is 25.82 kg/m².     CBC   Lab Results   Component Value Date    WBC 7.7 2021    RBC 3.61 2021    HGB 10.7 2021    HCT 32.4 2021    MCV 89.8 2021    RDW 13.1 2021     2021       CMP    Lab Results   Component Value Date     2016    K 4.0 2016     2016    CO2 24 2016    BUN 16 2016    CREATININE 0.68 2016    GFRAA >60 2016    LABGLOM >60 2016    GLUCOSE 87 2020    GLUCOSE 80 2016    PROT 7.0 2016    CALCIUM 9.4 2016    BILITOT 0.61 2016    ALKPHOS 36 2016    AST 13 2016    ALT 11 2016       BMP    Lab Results   Component Value Date     2016    K 4.0 2016     2016    CO2 24 2016    BUN 16 2016    CREATININE 0.68 2016    CALCIUM 9.4 2016    GFRAA >60 2016    LABGLOM >60 2016    GLUCOSE 87 2020    GLUCOSE 80 2016       POC Testing  No results for input(s): POCGLU, POCNA, POCK, POCCL, POCBUN, POCHEMO, POCHCT in the last 72 hours. Coags  No results found for: PROTIME, INR, APTT    HCG (If Applicable)   Lab Results   Component Value Date    PREGTESTUR POSITIVE 09/03/2020    HCG NEGATIVE 01/13/2017        ABGs No results found for: PHART, PO2ART, RXM0BGY, BCL3RAK, BEART, Z4BWLHED     Type & Screen (If Applicable)  No results found for: [facility]    Radiology (If Applicable)    Cardiac Testing (If Applicable)     EKG (If Applicable)           Procedure: * No procedures listed *    Medications prior to admission:   Prior to Admission medications    Medication Sig Start Date End Date Taking?  Authorizing Provider   Phenylephrine-Acetaminophen 5-325 MG TABS Take by mouth    Historical Provider, MD   docusate sodium (COLACE) 100 MG capsule Take 100 mg by mouth 2 times daily    Historical Provider, MD   Ferrous Sulfate (IRON PO) Take by mouth Every other day    Historical Provider, MD   Prenatal Vit-Fe Fumarate-FA (PNV PRENATAL PLUS MULTIVITAMIN) 27-1 MG TABS TAKE 1 TABLET BY MOUTH ONE TIME A DAY 8/9/20   Historical Provider, MD   Doxylamine Succinate, Sleep, (UNISOM PO) Take by mouth    Historical Provider, MD   Pyridoxine HCl (VITAMIN B-6 PO) Take by mouth    Historical Provider, MD       Current medications:    Current Facility-Administered Medications   Medication Dose Route Frequency Provider Last Rate Last Admin    ropivacaine (NAROPIN) 0.2% injection 0.2%             lactated ringers infusion   Intravenous Continuous Pablito Decant,  mL/hr at 03/08/21 1522 New Bag at 03/08/21 1522    ondansetron (ZOFRAN) injection 4 mg  4 mg Intravenous Q6H PRN Pablito Decant, DO        diphenhydrAMINE (BENADRYL) tablet 25 mg  25 mg Oral Q4H PRN Pablito Decant, DO        acetaminophen (TYLENOL) tablet 1,000 mg  1,000 mg Oral Q6H PRN Pablito Decant, DO        diphenhydrAMINE (BENADRYL) injection 25 mg  25 mg Intravenous Q6H PRN Mayo Pereyra DO   25 mg at 03/08/21   miSOPROStol (CYTOTEC) pre-split tablet TABS 50 mcg  50 mcg Oral Once Linden Coni, DO   Stopped at 03/08/21 2015    oxytocin (PITOCIN) 30 units in 500 mL infusion  1 henrietta-units/min Intravenous Continuous Kun Coni, DO 4 mL/hr at 03/09/21 0402 4 henrietta-units/min at 03/09/21 0402       Allergies:  No Known Allergies    Problem List:    Patient Active Problem List   Diagnosis Code    Irritable bowel syndrome K58.9    Echogenic focus of bowel, fetal, affecting care of mother, antepartum O35. 8XX0    Placenta previa (RESOLVED 12/18/20) O44.02    Pregnancy affected by fetal growth restriction O36.5990    Family history of neurofibromatosis Z82.79    38 weeks gestation of pregnancy Z3A.38       Past Medical History:        Diagnosis Date    Abnormal Pap smear of cervix     Anxiety     Mental disorder     DELORES       Past Surgical History:        Procedure Laterality Date    WISDOM TOOTH EXTRACTION         Social History:    Social History     Tobacco Use    Smoking status: Never Smoker    Smokeless tobacco: Never Used   Substance Use Topics    Alcohol use: Not Currently     Comment: socially                                Counseling given: Not Answered      Vital Signs (Current):   Vitals:    03/09/21 0402 03/09/21 0502 03/09/21 0601 03/09/21 0701   BP: (!) 94/50 101/64 110/69 112/74   Pulse: 66 65 67 66   Resp: 16 18 16 16   Temp:       TempSrc:       Weight:       Height:                                                  BP Readings from Last 3 Encounters:   03/09/21 112/74   03/04/21 122/74   03/03/21 107/65       NPO Status:                                                                                 BMI:   Wt Readings from Last 3 Encounters:   03/08/21 160 lb (72.6 kg)   03/04/21 160 lb (72.6 kg)   03/03/21 159 lb 8 oz (72.3 kg)     Body mass index is 25.82 kg/m².     CBC:   Lab Results   Component Value Date    WBC 7.7 03/08/2021    RBC 3.61 03/08/2021    HGB 10.7 03/08/2021    HCT 32.4 03/08/2021    MCV 89.8 03/08/2021    RDW 13.1 03/08/2021     03/08/2021       CMP:   Lab Results   Component Value Date     03/24/2016    K 4.0 03/24/2016     03/24/2016    CO2 24 03/24/2016    BUN 16 03/24/2016    CREATININE 0.68 03/24/2016    GFRAA >60 03/24/2016    LABGLOM >60 03/24/2016    GLUCOSE 87 12/21/2020    GLUCOSE 80 03/24/2016    PROT 7.0 03/24/2016    CALCIUM 9.4 03/24/2016    BILITOT 0.61 03/24/2016    ALKPHOS 36 03/24/2016    AST 13 03/24/2016    ALT 11 03/24/2016       POC Tests: No results for input(s): POCGLU, POCNA, POCK, POCCL, POCBUN, POCHEMO, POCHCT in the last 72 hours. Coags: No results found for: PROTIME, INR, APTT    HCG (If Applicable):   Lab Results   Component Value Date    PREGTESTUR POSITIVE 09/03/2020    HCG NEGATIVE 01/13/2017        ABGs: No results found for: PHART, PO2ART, DTS3ZIL, GAE9VKN, BEART, B0SMOBSW     Type & Screen (If Applicable):  No results found for: LABABO, LABRH    Drug/Infectious Status (If Applicable):  Lab Results   Component Value Date    HEPCAB NONREACTIVE 09/03/2020       COVID-19 Screening (If Applicable):   Lab Results   Component Value Date    COVID19 Not Detected 03/04/2021    COVID19 Not Detected 11/16/2020         Anesthesia Evaluation  Patient summary reviewed  Airway: Mallampati: II  TM distance: >3 FB   Neck ROM: full  Mouth opening: > = 3 FB Dental: normal exam         Pulmonary:Negative Pulmonary ROS and normal exam                               Cardiovascular:Negative CV ROS                      Neuro/Psych:   (+) psychiatric history:            GI/Hepatic/Renal: Neg GI/Hepatic/Renal ROS            Endo/Other: Negative Endo/Other ROS                    Abdominal:           Vascular: negative vascular ROS. Anesthesia Plan      epidural     ASA 2             Anesthetic plan and risks discussed with patient.                       BERNA Rushing - CRNA   3/9/2021

## 2021-03-09 NOTE — FLOWSHEET NOTE
Patient admitted to room 744 from L&D via . Oriented to room and surroundings. Plan of care reviewed. Verbalized understanding. Instructed on infant security and safe sleep practices. Preventing falls education provided . The following handouts given: A New Beginning: Your Guide to Postpartum Care, Rounding, gs Security System,Babies Cry A lot, Safe Sleep, Security and Visitation Guidelines. Call light placed within reach.

## 2021-03-09 NOTE — FLOWSHEET NOTE
Rachel balloon placed per Dr. Marge Robins and inflated to 60mL. Tension applied with tape to leg. Pt tolerated well.

## 2021-03-09 NOTE — L&D DELIVERY NOTE
blood disposition: Lab  Gases sent?: Yes     Placenta    Date/time: 3/9/2021 14:30:00  Removal: Spontaneous  Appearance: Intact     Delivery Resuscitation    Method: Bulb Suction, Stimulation     Apgars    Living status: Living  Apgars   1 Minute:  5 Minute:  10 Minute 15 Minute 20 Minute   Skin Color: 0  1       Heart Rate: 2  2       Reflex Irritability: 2  2       Muscle Tone: 2  2       Respiratory Effort: 2  2       Total: 8  9               Apgars Assigned By: Tita Galloway RN     Skin to Skin    Skin to skin initiation date/time: 3/9/21 14:19:00   Skin to skin with: Mother  Skin to skin end date/time:         Measurements    Weight: 2845 g       Delivery Information    Episiotomy: None  Perineal lacerations: 1st Repaired: Yes   Periurethral laceration: bilateral Repaired: No   Surgical or additional est. blood loss (mL): 0 (View Only): Edit in Flowsheets   Combined est. blood loss (mL): 0     Vaginal Delivery Counts    Initial count personnel: SALO ARDON RN  Initial count verified by: Ila Nicole RN   4x4:  Needles:  Instruments:  Lap Pads:  Sponges:    Initial counts:         Final counts:                 Vaginal Delivery Note  Department of Obstetrics and Gynecology  [address] [place] [address]       Patient: Troy Brown   : 1995  MRN: [id number]   Date of delivery: 3/9/21     Pre-operative Diagnosis:   Troy Brown  at 38w6d   IOL 2/2 IUGR (AC<10%ile)     Echogenic fetal bowel     Family history of neurofibromatosis     Anemia    BMI 25.82    Post-operative Diagnosis:  Same, Living  infant    Delivering Obstetrician & Assistant(s): Dr. Jose Nunez; Venkata Maxwell,  PGY4    Infant Information:   Information for the patient's :  [place] [[id number]]        Information for the patient's :  Kevin, Baby Girl Mittie Heather [[id number]]          Apgar scores: 8 at 1 minute and 9 at 5 minutes.      Anesthesia:  epidural anesthesia    Application and Delivery:    She was known to be GBS negative. The patient progressed well, received an epidural, became complete and felt the urge to push. After pushing with contractions the fetal head delivered Cephalic, left occiput anterior over an intact perineum, nuchal cord was not present. The anterior, then posterior shoulder delivered easily and atraumatically followed by the rest of the infant. Nose and mouth suctioned with bulb suction, infant was stimulated and dried. Cord was clamped and cut after one minute delayed cord clamping  and infant was placed on maternal abdomen, and attended by RN for evaluation. The delivery of the placenta was spontaneous and appeared intact, whole and that the umbilical cord had three vessels noted. Patient lost IV access, therefore IM pitocin was given. Uterine atony was noted, therefore methergine was given x1. Atony resolved. IV access was obtained. Pitocin was started. The vagina was swept of all clots and debris. The perineum and vagina were evaluated and bilateral periurethral lacerations were found to be hemostatic and then a 1st degree laceration was found and repaired in standard fashion with 3-0 vicryl. Mother and baby tolerated procedure well. Dr. Joana Snowden was present for entire delivery.     Delivery Summary:  Labor & Delivery Summary  Dilation Complete Date: 03/09/21  Dilation Complete Time: 7114    Specimen: placenta sent to pathology, cord blood and cord gases  Quantitative blood loss:  800ml immediately following delivery  Condition:  infant stable to general nursery and mother stable  Counts: instrument and sponge counts correct  Blood Type and Rh: B POSITIVE    Rubella Immunity Status: [id number] [facility],[address], DO  Ob/Gyn Resident  3/9/2021, 3:15 PM

## 2021-03-09 NOTE — ANESTHESIA PROCEDURE NOTES
Epidural Block    Patient location during procedure: OB  Start time: 3/9/2021 7:28 AM  End time: 3/9/2021 7:33 AM  Reason for block: labor epidural  Staffing  Performed: resident/CRNA   Anesthesiologist: Kaz Brar MD  Resident/CRNA: BERNA Veloz CRNA  Preanesthetic Checklist  Completed: patient identified, IV checked, site marked, risks and benefits discussed, surgical consent, monitors and equipment checked, pre-op evaluation, timeout performed, anesthesia consent given, oxygen available and patient being monitored  Epidural  Patient position: sitting  Prep: Betadine  Patient monitoring: continuous pulse ox and frequent blood pressure checks  Approach: midline  Location: lumbar (1-5)  Injection technique: YUMI saline  Guidance: paresthesia technique  Provider prep: mask and sterile gloves  Needle  Needle type: Tuohy   Needle gauge: 17 G  Needle length: 3.5 in  Needle insertion depth: 4.5 cm  Catheter type: end hole  Catheter at skin depth: 12 cm  Test dose: negative  Assessment  Hemodynamics: stable  Attempts: 1

## 2021-03-09 NOTE — ANESTHESIA POSTPROCEDURE EVALUATION
Department of Anesthesiology  Postprocedure Note    Patient: Sallie Amaya  MRN: [id number]  YOB: 1995  Date of evaluation: 3/9/2021  Time:  5:59 PM     Procedure Summary     Date: 03/09/21 Room / Location:     Anesthesia Start: 0728 Anesthesia Stop: 7168    Procedure: Labor Analgesia Diagnosis:     Scheduled Providers:  Responsible Provider: Maira Ho MD    Anesthesia Type: epidural ASA Status: 2          Anesthesia Type: epidural    Mikki Phase I: Mikki Score: 10    Mikki Phase II: Mikki Score: 10    Last vitals: Reviewed and per EMR flowsheets.        Anesthesia Post Evaluation    Patient location during evaluation: bedside  Patient participation: complete - patient participated  Level of consciousness: awake and alert  Pain score: 1  Airway patency: patent  Nausea & Vomiting: no nausea and no vomiting  Complications: no  Cardiovascular status: hemodynamically stable  Respiratory status: room air  Hydration status: euvolemic

## 2021-03-09 NOTE — FLOWSHEET NOTE
Gentle traction applied to thompson balloon and thompson balloon able to be removed. Pt tolerated well. Dr. Rayo Larios updated.

## 2021-03-09 NOTE — LACTATION NOTE
First time breastfeeding mom, fibrous nipples with prior piercings that invert with compression. Baby actively trying to nurse with lots of suckling and licking but unable to maintain latch. 20mm shield given, application demonstrated. Baby latched easily with sustained suck. Education folder given.

## 2021-03-10 VITALS
RESPIRATION RATE: 16 BRPM | SYSTOLIC BLOOD PRESSURE: 93 MMHG | BODY MASS INDEX: 25.71 KG/M2 | HEIGHT: 66 IN | HEART RATE: 91 BPM | DIASTOLIC BLOOD PRESSURE: 60 MMHG | WEIGHT: 160 LBS | OXYGEN SATURATION: 99 % | TEMPERATURE: 98.2 F

## 2021-03-10 PROCEDURE — 6370000000 HC RX 637 (ALT 250 FOR IP): Performed by: STUDENT IN AN ORGANIZED HEALTH CARE EDUCATION/TRAINING PROGRAM

## 2021-03-10 RX ADMIN — IBUPROFEN 800 MG: 800 TABLET, FILM COATED ORAL at 10:07

## 2021-03-10 RX ADMIN — ACETAMINOPHEN 1000 MG: 500 TABLET ORAL at 05:33

## 2021-03-10 RX ADMIN — DOCUSATE SODIUM 100 MG: 100 CAPSULE, LIQUID FILLED ORAL at 08:23

## 2021-03-10 RX ADMIN — IBUPROFEN 800 MG: 800 TABLET, FILM COATED ORAL at 18:13

## 2021-03-10 ASSESSMENT — PAIN DESCRIPTION - LOCATION: LOCATION: ABDOMEN

## 2021-03-10 ASSESSMENT — PAIN SCALES - GENERAL
PAINLEVEL_OUTOF10: 4
PAINLEVEL_OUTOF10: 4

## 2021-03-10 ASSESSMENT — PAIN DESCRIPTION - DESCRIPTORS: DESCRIPTORS: CRAMPING

## 2021-03-10 NOTE — PLAN OF CARE
Problem: Fluid Volume - Imbalance:  Goal: Absence of imbalanced fluid volume signs and symptoms  Description: Absence of imbalanced fluid volume signs and symptoms  3/9/2021 1710 by Anastasia Andino RN  Outcome: Completed  Goal: Absence of intrapartum hemorrhage signs and symptoms  Description: Absence of intrapartum hemorrhage signs and symptoms  3/9/2021 1710 by Anastasia Andino RN  Outcome: Completed  Goal: Absence of imbalanced fluid volume signs and symptoms  Description: Absence of imbalanced fluid volume signs and symptoms  Outcome: Met This Shift  Goal: Absence of postpartum hemorrhage signs and symptoms  Description: Absence of postpartum hemorrhage signs and symptoms  Outcome: Met This Shift     Problem: Pain - Acute:  Goal: Pain level will decrease  Description: Pain level will decrease  3/9/2021 1710 by Anastasia Andino RN  Outcome: Completed  Goal: Able to cope with pain  Description: Able to cope with pain  3/9/2021 1710 by Anastasia Andino RN  Outcome: Completed  Goal: Pain level will decrease  Description: Pain level will decrease  Outcome: Met This Shift     Problem: Discharge Planning:  Goal: Discharged to appropriate level of care  Description: Discharged to appropriate level of care  Outcome: Met This Shift     Problem: Constipation:  Goal: Bowel elimination is within specified parameters  Description: Bowel elimination is within specified parameters  Outcome: Met This Shift     Problem: Infection - Risk of, Puerperal Infection:  Goal: Will show no infection signs and symptoms  Description: Will show no infection signs and symptoms  Outcome: Met This Shift     Problem: Mood - Altered:  Goal: Mood stable  Description: Mood stable  Outcome: Met This Shift

## 2021-03-10 NOTE — FLOWSHEET NOTE
I have reviewed all [facility] Post-Birth Warning Signs and essential teaching points for pulmonary embolism, cardiac disease, hypertensive disorders of pregnancy, obstetric hemorrhage, venous thromboembolism, infection, and postpartum depression with the patient and . I have informed the patient on when to call their healthcare provider and when to call 911. I have discussed with the patient  the importance of scheduling a follow-up visit with their physician, nurse practitioner or midwife and provided them with correct contact information for appointment. I have provided the patient with a copy of the \"Save Your Life\" handout. The patient has acknowledged receiving and understanding this education with her signature.

## 2021-03-10 NOTE — PLAN OF CARE
Problem: Fluid Volume - Imbalance:  Goal: Absence of imbalanced fluid volume signs and symptoms  Description: Absence of imbalanced fluid volume signs and symptoms  3/10/2021 1826 by My Valerio RN  Outcome: Completed  3/10/2021 0433 by J Luis Bush RN  Outcome: Met This Shift  Goal: Absence of postpartum hemorrhage signs and symptoms  Description: Absence of postpartum hemorrhage signs and symptoms  3/10/2021 1826 by My Valerio RN  Outcome: Completed  3/10/2021 0433 by J Luis Bush RN  Outcome: Met This Shift     Problem: Pain - Acute:  Goal: Pain level will decrease  Description: Pain level will decrease  3/10/2021 1826 by My Valerio RN  Outcome: Completed  3/10/2021 0433 by J Luis Bush RN  Outcome: Met This Shift     Problem: Discharge Planning:  Goal: Discharged to appropriate level of care  Description: Discharged to appropriate level of care  3/10/2021 1826 by My Valerio RN  Outcome: Completed  3/10/2021 0433 by J Luis Bush RN  Outcome: Met This Shift     Problem: Constipation:  Goal: Bowel elimination is within specified parameters  Description: Bowel elimination is within specified parameters  3/10/2021 1826 by My Valerio RN  Outcome: Completed  3/10/2021 0433 by J Luis Bush RN  Outcome: Met This Shift     Problem: Infection - Risk of, Puerperal Infection:  Goal: Will show no infection signs and symptoms  Description: Will show no infection signs and symptoms  3/10/2021 1826 by My Valerio RN  Outcome: Completed  3/10/2021 0433 by J Luis Bush RN  Outcome: Met This Shift     Problem: Mood - Altered:  Goal: Mood stable  Description: Mood stable  3/10/2021 1826 by My Valerio RN  Outcome: Completed  3/10/2021 0433 by J Luis Bush RN  Outcome: Met This Shift

## 2021-03-10 NOTE — CARE COORDINATION
Discharge Planning:     Anticipate DC of couplet 3/11/21  after  3/9/21    Female  Infant to [facility]    After speaking w/ [place],  no anticipated need for HC/DME at this time. CM will continue to follow for DC needs.

## 2021-03-10 NOTE — PROGRESS NOTES
CLINICAL PHARMACY NOTE: MEDS TO [address] Select Patient?: No  Total # of Prescriptions Filled: 2   The following medications were delivered to the patient:  · Motrin  · colace  Total # of Interventions Completed: 0  Time Spent (min): 0    Additional Documentation:
Labor Progress Note    Gigi Iverson is a 27 y.o. female  at 38w7d  The patient was seen and examined. Her pain is well controlled with her epidural. She reports fetal movement is present, complains of contractions, complains of loss of fluid, denies vaginal bleeding. Vital Signs:  Vitals:    21 0751 21 0756 21 0800 21 0801   BP: 115/66 112/68  114/60   Pulse: 66 64  67   Resp: 16 18  16   Temp:       TempSrc:       SpO2:   100%    Weight:       Height:         FHT: 155, moderate variability, accelerations present, decelerations a few variable and lates after the epidural but overall reassuring and resolved with position changes   Contractions: regular, every 2 to 4 minutes    Chaperone for Intimate Exam: Chaperone was present for entire exam, Chaperone Name: Anna  Cervical Exam: 5 cm dilated, 90 effaced, 0 out of 3 station  Pitocin: @ 4 mu/min    Membranes: Ruptured clear fluid  Scalp Electrode in place: absent  Intrauterine Pressure Catheter in Place: absent    Interventions: none    Assessment/Plan:  Gigi Iverson is a 27 y.o. female  at 38w7d admitted for IOL 2/2 IUGR (AC<10%)   - GBS negative, No indication for GBS prophylaxis   - VSS. Afebrile   - cEFM/TOCO: A few variable and late decels after epidural, resolved with position changes. Overall reassuring.    - IVF LR @125ml/hr   - Continue pitocin per protocol    - AROM (clr) @0630    - Epidural    - S/p thompson balloon    - S/p Morphine/phenergan x1    - S/p Cytotec 25 PV x1, 50 PO x1    - COVID neg 3/4    - Continue current management. Position changes as needed.      Attending updated and in agreement with plan    Ryland Nassar DO  Ob/Gyn Resident  3/9/2021, 9:11 AM
Labor Progress Note    Harrison Zapien is a 27 y.o. female  at 38w7d  The patient was seen and examined. Her pain is well controlled. She reports fetal movement is present, complains of contractions, denies loss of fluid, denies vaginal bleeding. AROM performed, clear fluid noted, slightly blood tinged. Patient tolerated well.       Vital Signs:  Vitals:    21 0203 21 0300 21 0402 21 0502   BP: 107/65 107/70 (!) 94/50 101/64   Pulse: 73 63 66 65   Resp: 18 16 16 18   Temp:       TempSrc:       Weight:       Height:            FHT: 130, moderate variability, accelerations present, decelerations absent  Contractions: regular, every 1-3 minutes    Chaperone for Intimate Exam: Chaperone was present for entire exam, Chaperone Name: Nani Perrin RN  Cervical Exam: 5 cm dilated, 50 effaced, -1 station  Pitocin: @ 4 mu/min    Membranes: Ruptured clear fluid  Scalp Electrode in place: absent  Intrauterine Pressure Catheter in Place: absent    Interventions: AROM performed, clear fluid noted, slightly blood tinged, patient tolerated well    Assessment/Plan:  Harrison Zapien is a 27 y.o. female  at 38w7d admitted for IOL 2/2 IUGR              - GBS negative, No indication for GBS prophylaxis              - VSS, afebrile              - cEFM/TOCO              - S/p cytotec 25mcg PV x1, cytotec 50mcg buccal x1              - S/p thompson balloon              - S/p morphine/phenergan x1              - Continue pitocin per protocol              - FHT improved, category 1   - AROM performed, clear fluid, patient tolerated well    Attending updated and in agreement with plan    Joseph Melendrez DO  Ob/Gyn Resident  3/9/2021, 6:33 AM
Labor Progress Note    Jannet Mills is a 27 y.o. female  at 38w7d  The patient was seen and examined. Her pain is well controlled. She reports fetal movement is present, complains of contractions, denies loss of fluid, denies vaginal bleeding.        Vital Signs:  Vitals:    21 0003 21 0203 21 0300 21 0402   BP: 116/76 107/65 107/70 (!) 94/50   Pulse: 65 73 63 66   Resp: 16 18 16 16   Temp: 98 °F (36.7 °C)      TempSrc: Oral      Weight:       Height:            FHT: 145, moderate variability, accelerations present, few variable decelerations present with spontaneous resolution  Contractions: regular, every 2-3 minutes    Chaperone for Intimate Exam: Chaperone was present for entire exam, Chaperone Name: Devin Cardona RN  Cervical Exam: 5 cm dilated, 50 effaced, -1 station  Pitocin: @ 4 mu/min    Membranes: Intact  Scalp Electrode in place: absent  Intrauterine Pressure Catheter in Place: absent    Interventions: SVE performed    Assessment/Plan:  Jannet Mills is a 27 y.o. female  at 38w7d admitted for IOL 2/2 IUGR   - GBS negative, No indication for GBS prophylaxis   - VSS, afebrile   - cEFM/TOCO   - S/p cytotec 25mcg PV x1, cytotec 50mcg buccal x1   - S/p thompson balloon   - S/p morphine/phenergan x1   - Continue pitocin per protocol   - S/p IVF bolus for previous occasional late deceleration with improvement, some small variable decelerations noted, will continue intrauterine resuscitation   - Plan for AROM when cervix thins      Onofre Domingo DO  Ob/Gyn Resident  3/9/2021, 4:38 AM
Labor Progress Note    Katt Art is a 27 y.o. female  at 44w7d  The patient was seen and examined. Her pain is well controlled. She reports fetal movement is present, denies contractions, denies loss of fluid, denies vaginal bleeding.        Vital Signs:  Vitals:    21 0655 21 0715 21 1203   BP: 114/67  110/68   Pulse: 87  80   Resp: 16  16   Temp: 98.6 °F (37 °C)  98.2 °F (36.8 °C)   TempSrc: Oral  Oral   Weight:  160 lb (72.6 kg)    Height:  5' 6\" (1.676 m)          FHT: 140, moderate variability, accelerations present, decelerations absent  Contractions: none    Cervical Exam: deferred  Pitocin: @ 0 mu/min    Membranes: Intact  Scalp Electrode in place: absent  Intrauterine Pressure Catheter in Place: absent    Interventions: none    Assessment/Plan:  Driss Brown is a 27 y.o. female  at 44w7d admitted for IOL 2/2 IUGR   - GBS negative, No indication for GBS prophylaxis   - VSS, afebrile   - cEFM/TOCO   - LR @ 125 ml/hr   - Cat 1 tracing   - Cytotec 50 PO ordered   - S/p Cytotec 25 PC    - Continue to monitor        Attending updated and in agreement with plan    Philippe Guzman DO  Ob/Gyn Resident  3/8/2021, 1:43 PM
Labor Progress Note    Nurys River is a 27 y.o. female  at 44w7d  The patient was seen and examined. Her pain is well controlled. She reports fetal movement is present, complains of contractions, denies loss of fluid, denies vaginal bleeding. Informed by RN that thompson balloon out. SVE performed. Vital Signs:  Vitals:    21 0715 21 1203 21 1604 21   BP:  110/68 110/67 108/65   Pulse:  80 77 77   Resp:  16 16 18   Temp:  98.2 °F (36.8 °C) 98 °F (36.7 °C) 98.1 °F (36.7 °C)   TempSrc:  Oral Oral Oral   Weight: 160 lb (72.6 kg)      Height: 5' 6\" (1.676 m)           FHT: 120, moderate variability, accelerations present, decelerations absent  Contractions: irregular, every 1-6 minutes    Chaperone for Intimate Exam: Chaperone was present for entire exam, Chaperone Name: Christi Lees RN  Cervical Exam: 4 cm dilated, 50 effaced, -1 station  Pitocin: @ 0 mu/min    Membranes: Intact  Scalp Electrode in place: absent  Intrauterine Pressure Catheter in Place: absent    Interventions: Thompson out. SVE performed.      Assessment/Plan:  Nurys River is a 27 y.o. female  at 44w7d admitted for IOL 2/2 IUGR              - GBS negative, No indication for GBS prophylaxis              - VSS, afebrile              - cEFM/TOCO              - S/p cytotec 25mcg PV x1, cytotec 50mcg buccal x1              - S/p thompson balloon              - S/p Morphine/phenergan x1   - Pit ordered per protocol, clear liquid diet      Attending updated and in agreement with plan    Vanesa Marks DO  Ob/Gyn Resident  3/8/2021, 11:25 PM
Labor Progress Note    Shirley Butt is a 27 y.o. female  at 44w7d  The patient was seen and examined. Her pain is well controlled. She reports fetal movement is present, denies contractions, denies loss of fluid, denies vaginal bleeding.        Vital Signs:  Vitals:    21 0655 21 0715   BP: 114/67    Pulse: 87    Resp: 16    Temp: 98.6 °F (37 °C)    TempSrc: Oral    Weight:  160 lb (72.6 kg)   Height:  5' 6\" (1.676 m)         FHT: 140, moderate variability, accelerations present, decelerations absent  Contractions: none    Chaperone for Intimate Exam: Chaperone was present for entire exam, Chaperone Name: Allison Leblanc RN  Cervical Exam: FT cm dilated, thick effaced, -3 station  Pitocin: @ 0 mu/min    Membranes: Intact  Scalp Electrode in place: absent  Intrauterine Pressure Catheter in Place: absent    Interventions: none    Assessment/Plan:  Shirley Butt is a 27 y.o. female  at 44w7d admitted for IOL 2/2 IUGR   - GBS negative, No indication for GBS prophylaxis   - VSS, afebrile   - cEFM/TOCO   - LR @ 125 ml/hr   - Cat 1 tracing   - Cytotec 25 PC placed   - Continue to monitor        Attending updated and in agreement with plan    Luis Enrique Slater DO  Ob/Gyn Resident  3/8/2021, 9:48 AM
Labor Progress Note    Yvette Penaloza is a 27 y.o. female  at 38w7d  The patient was seen and examined. Her pain is well controlled. She reports fetal movement is present, complains of contractions, denies loss of fluid, denies vaginal bleeding. Patient is overall comfortable.       Vital Signs:  Vitals:    21 1203 21 1604 21 2007 21 0003   BP: 110/68 110/67 108/65 116/76   Pulse: 80 77 77 65   Resp: 16 16 18 16   Temp: 98.2 °F (36.8 °C) 98 °F (36.7 °C) 98.1 °F (36.7 °C) 98 °F (36.7 °C)   TempSrc: Oral Oral Oral Oral   Weight:       Height:            FHT: 130, moderate variability, accelerations present, two late decelerations noted which have resolved  Contractions: irregular, every 1-6 minutes    Cervical Exam: deferred  Pitocin: @ 6 mu/min    Membranes: Intact  Scalp Electrode in place: absent  Intrauterine Pressure Catheter in Place: absent    Interventions: none    Assessment/Plan:  Driss Brown is a 27 y.o. female  at 38w7d admitted for IOL 2/2 IUGR              - GBS negative, No indication for GBS prophylaxis              - VSS, afebrile              - cEFM/TOCO              - S/p cytotec 25mcg PV x1, cytotec 50mcg buccal x1              - S/p thompson balloon              - S/p Morphine/phenergan x1              - Continue pit per protocol   - Intrauterine resuscitation for late decelerations, overall improved   - Plan to AROM when in good contraction pattern    Attending updated and in agreement with plan    Michael Rosen DO  Ob/Gyn Resident  3/9/2021, 2:13 AM
Labor Progress Note    Yvette Penaloza is a 27 y.o. female  at 44w7d  The patient was seen and examined. Her pain is well controlled. She reports fetal movement is present, complains of contractions, denies loss of fluid, denies vaginal bleeding. SVE performed. Thompson balloon placed, patient tolerated well. Inflated to 60cc.       Vital Signs:  Vitals:    21 0715 21 1203 21 1604 21   BP:  110/68 110/67 108/65   Pulse:  80 77 77   Resp:  16 16 18   Temp:  98.2 °F (36.8 °C) 98 °F (36.7 °C) 98.1 °F (36.7 °C)   TempSrc:  Oral Oral Oral   Weight: 160 lb (72.6 kg)      Height: 5' 6\" (1.676 m)           FHT: 140, moderate variability, accelerations present, decelerations absent  Contractions: regular, every 1-3 minutes    Chaperone for Intimate Exam: Chaperone was present for entire exam, Chaperone Name: Love Duty, RN  Cervical Exam: 1 cm dilated, 50 effaced, -1 station  Pitocin: @ 0 mu/min    Membranes: Intact  Scalp Electrode in place: absent  Intrauterine Pressure Catheter in Place: absent    Interventions: SVe performed, thompson balloon placed, patient tolerated well, inflated to 60cc    Assessment/Plan:  Yvette Penaloza is a 27 y.o. female  at 44w7d admitted for IOL 2/2 IUGR   - GBS negative, No indication for GBS prophylaxis   - VSS, afebrile   - cEFM/TOCO   - S/p cytotec 25mcg PV x1, cytotec 50mcg buccal x1   - Thompson balloon placed and inflated to 60cc, patient tolerated well   - Morphine/phenergan ordered for pain   - Plan for another buccal cytotec vs low dose pitocin if spaces out    Attending updated and in agreement with plan    Michael Rosen DO  Ob/Gyn Resident  3/8/2021, 9:23 PM
Loss of IV access at 1420. IM pitocin given per verbal order- Dr Winkler. IM methergine given IM per order. Fundal massage continued after placenta delivery. IV inserted and IV pitocin started.
Papito Armstrong CRNA   0725 at bedside. Epidural procedure explained, risks discussed. Pt verbalizes consent for epidural.   0730 patient positioned for epidural.0730 Time out completed. 9711 catheter placed. 2655 test dose given. Epidural catheter taped and secured per anesthesia. 0517 to low fowlers with left uterine displacement. 0735  loading dose given. 0738 pump initiated. Pt tolerated procedure well.
Writer at bedside from 1225 to 1419(delivery) difficult to trace at times. Writer and Dr Winkler, Dr Keerthi Ocampo at bedside during this time.
Encourage ambulation   - Postpartum Hgb/Hct if symptomatic  2. Rh positive/Rubella immune  3. Breast feeding   4. Uterine Atony   -S/p metherine x1, IM pitocin x1  5. Anemia due to inadequate iron intake   - Denies s/s of anemia   - 10.7 on admission  6. Continue post partum care    Counseling Completed:  Secondary Smoke risks and Sudden Infant Death Syndrome were reviewed with recommendations. Infant sleeping, \"back to sleep\" and avoidance of co-sleeping recommendations were reviewed. Signs and Symptoms of Post Partum Depression were reviewed. The patient is to call if any occur. Signs and symptoms of Mastitis were reviewed. The patient is to call if any occur for follow up. Discharge instructions including pelvic rest, no driving with pain medicine and office follow-up were reviewed with patient     Attending Physician: Dr. Abdirashid Butt DO  Ob/Gyn Resident   3/10/2021, 6:44 AM     Attending Physician Statement  I have discussed the care of [address], including pertinent history and exam findings with the resident. I have reviewed and edited their note in the electronic medical record. The key elements of all parts of the encounter have been performed/reviewed by me . I agree with the assessment, plan and orders as documented by the resident. The level of care submitted represents to the best of my ability the care documented in the medical record today. GC Modifier. This service has been performed in part by a resident under the direction of a teaching physician. PPD#1 from  doing well. VSS. Breastfeeding. Desires D/C at 24 hours. Alicia Pollard for D/C with follow up in 2 weeks.     Attending's Name:  Tex Silva DO

## 2021-03-10 NOTE — CARE COORDINATION
Social Work     John intern met with mom to assess needs. Mom reported feeling good. Fob and GM (holding baby) on couch and engaged in conversation. Mom denied any current s/s of anxiety or depression. Mom stated that she would reach out to her OB if any s/s came up after discharge. Mom reports having a good support system that consists of mom's sisters, grandparents, fob;s family, and many others. In the home is fob, mob, and now baby girl. Mom reports having all needed baby items including a crib and bassinet for safe sleep. Mom denied currently being linked with any community resources. Mom stated that she tried to sign up for [facility] but was denied due to income. Mom stated possibly calling again now that baby was born to see if mom qualifications would change. PCP has not yet been chosen, but mom reports being in the process of choosing one. Mom denied having ay questions for john mary. John intern encouraged mom to reach out if any needs were to arise. The above note was written by Janee beebe

## 2021-03-11 LAB — SURGICAL PATHOLOGY REPORT: NORMAL

## 2021-03-23 ENCOUNTER — TELEMEDICINE (OUTPATIENT)
Dept: OBGYN CLINIC | Age: 26
End: 2021-03-23

## 2021-03-23 PROCEDURE — 99024 POSTOP FOLLOW-UP VISIT: CPT | Performed by: STUDENT IN AN ORGANIZED HEALTH CARE EDUCATION/TRAINING PROGRAM

## 2021-03-23 NOTE — PROGRESS NOTES
[facility]  [facility] Roxana Baires [address] [address]  Dept: [phone]  Dept Fax: [phone]  03/23/21      TELEHEALTH VIDEO VISIT    This visit was completed via MyChart video due to the restrictions of the COVID-19 pandemic. All issues as below were discussed and addressed but no physical exam was performed unless allowed by visual confirmation on MyChart video. Reviewed that if it was felt that the patient should be evaluated in clinic then she would be directed there. The patient verbally consented to visit. Yvette Penaloza is a 27 y.o. Negar Jennifer. She is the only one present and is currently at home. She has no chief complaint today. Her pregnancy was complicated by IUGR. She is not breast feeding and denies signs or symptoms of mastitis. The patient is breast pumping and does feel like a \"sense of impending doom\" every time she pumps for the first 2 to 3 minutes. She denies any suicidal homicidal ideations but it is very unsettling for her. She is only pumping and getting about 1 ounce out every time and supplementing with formula. Patient to decide if she wants to continue brest pumping as she does not like feeling that way. The patient completed the E.P.D.S. Post Partum Depression Evaluation form and scored 10. She does not have signs or symptoms of post partum depression. She denies any suicidal thoughts with a plan, intent to harm others and delusional ideas. Patient has struggled with anxiety for most of her life but has been able to counteract this by lifestyle modifications. Due to giving birth recently, she is having a more difficult time managing this but would not like to be started on medication at this time. She does admit to having good home support. Today her lochia is light she denies any dizziness or shortness of breath. Her bowels are regular and she denies any urinary tract symptomology.  She is using abstinence for family planning and for STD prevention. REVIEW OF SYSTEMS:     Constitutional: negative fever, negative chills  HEENT: negative visual disturbances, negative headaches  Respiratory: negative dyspnea, negative cough  Cardiovascular: negative chest pain,  negative palpitations  Gastrointestinal: negative Abdominal pain, negative RUQ pain, negative N/V/D, negative constipation  Genitourinary: negative dysuria, negative vaginal discharge  Dermatological: negative rash, negative wounds  Hematologic: negative bleeding/clotting disorder  Immunologic: negative recent illness, negative recent sick contact, negative allergic reactions  Lymphatic: negative lymph nodes  Musculoskeletal: negative back pain, negative myalgias, negative arthralgias  Neurological:  negative dizziness, negative weakness  Behavior/Psych: negative depression, positive anxiety      Physical Exam: (performed to the best of my ability via webcam)  General Appearance: Appears healthy. Alert; in no acute distress. Pleasant. HEENT: normocephalic and atraumatic  Respiratory: Normal respiratory rate without signs of respiratory distress    Musculoskeletal: no gross abnormalities  Psych:  oriented to time, place and person, mood and affect are within normal limits       Assessment/Plan  Driss Brown is a 27 y.o.  2 weeks Post Partum s/p spontaneous vaginal delivery on 3/9/21   - Stable, continue routine post partum care      Counseling Completed:  · Signs & Symptoms of mastitis and when to notify office discussed. · Secondary smoke risks including increased risks of respiratory problems, Sudden infant death syndrome, and potential malignancies discussed  · Abstinence, family planning counseling and STD counseling discussed  · Continue with post operative restrictions until 6 weeks post partum  · No heavy lifting or [place] until 6 weeks post partum  All questions answered and patient vocalized understanding.  She is grateful that she did not have to leave her home quarantine for this visit. Due to this being a TeleHealth encounter ([facility]), evaluation of the following organ systems was limited: Vitals/Constitutional/EENT/Resp/CV/GI//MS/Neuro/Skin/Heme-Lymph-Imm. Pursuant to the emergency declaration under the [facility], [address] [place] [address] authority and the [employer] Act, this Virtual Visit was conducted with patient's (and/or legal guardian's) consent, to reduce the patient's risk of exposure to COVID-19 and provide necessary medical care. The patient (and/or legal guardian) has also been advised to contact this office for worsening conditions or problems, and seek emergency medical treatment and/or call 911 if deemed necessary. Services were provided through a video synchronous discussion virtually to substitute for in-person clinic visit. Patient was located at home and provider at her office in [facility]. Spent 15 minutes with patient face to face during video visit. More than 50% of this time was spent in counseling and coordination of care.        Ellen Jacobsi [facility]  3/23/2021 2:40 PM

## 2021-04-20 ENCOUNTER — POSTPARTUM VISIT (OUTPATIENT)
Dept: OBGYN CLINIC | Age: 26
End: 2021-04-20

## 2021-04-20 VITALS
WEIGHT: 141 LBS | SYSTOLIC BLOOD PRESSURE: 108 MMHG | DIASTOLIC BLOOD PRESSURE: 76 MMHG | HEIGHT: 66 IN | BODY MASS INDEX: 22.66 KG/M2

## 2021-04-20 DIAGNOSIS — F41.9 ANXIETY: ICD-10-CM

## 2021-04-20 PROBLEM — O36.5990 PREGNANCY AFFECTED BY FETAL GROWTH RESTRICTION: Status: RESOLVED | Noted: 2021-02-08 | Resolved: 2021-04-20

## 2021-04-20 PROBLEM — O44.02 PLACENTA PREVIA IN SECOND TRIMESTER: Status: RESOLVED | Noted: 2020-12-21 | Resolved: 2021-04-20

## 2021-04-20 PROBLEM — O35.DXX0 ECHOGENIC FOCUS OF BOWEL, FETAL, AFFECTING CARE OF MOTHER, ANTEPARTUM: Status: RESOLVED | Noted: 2020-11-13 | Resolved: 2021-04-20

## 2021-04-20 PROCEDURE — 0503F POSTPARTUM CARE VISIT: CPT | Performed by: STUDENT IN AN ORGANIZED HEALTH CARE EDUCATION/TRAINING PROGRAM

## 2021-04-20 RX ORDER — BUSPIRONE HYDROCHLORIDE 5 MG/1
5 TABLET ORAL 2 TIMES DAILY
Qty: 120 TABLET | Refills: 3 | Status: SHIPPED | OUTPATIENT
Start: 2021-04-28 | End: 2022-04-13 | Stop reason: ALTCHOICE

## 2021-04-20 NOTE — PROGRESS NOTES
Postpartum Visit    Abi Butler is a 27 y.o. female , 6 weeks Post Partum s/p spontaneous vaginal delivery on 3/9/21    The patient was seen. She has no chief complaint today. Her pregnancy was complicated by IUGR. She is not breast feeding and denies signs or symptoms of mastitis. The patient completed the E.P.D.S. Post Partum Depression Evaluation form and scored 12. She does not have signs or symptoms of post partum depression. She denies any suicidal thoughts with a plan, intent to harm others and delusional ideas. She does admit to having good home support. Today her lochia is light she denies any dizziness or shortness of breath. Her bowels are regular and she denies any urinary tract symptomology. She is using abstinence for family planning and for STD prevention. She is interested in [place] for contraception. Patient has always struggled with baseline anxiety and she feels like she would like to start a medication for this. Discussed options of BuSpar versus Lexapro. Patient would like to start BuSpar at this time. OB History    Para Term  AB Living   1 1 1 0 0 1   SAB TAB Ectopic Molar Multiple Live Births   0 0 0 0 0 1     Patient Active Problem List   Diagnosis    Irritable bowel syndrome    Family history of neurofibromatosis     3/9/21 F APG 8 Wt 6#4       Vitals:   Blood pressure 108/76, height 5' 6\" (1.676 m), weight 141 lb (64 kg), not currently breastfeeding. Physical Exam:  General:  no apparent distress, alert and cooperative  Lungs:  No increased work of breathing, good air exchange, clear to auscultation bilaterally, no crackles or wheezing  Heart:  regular rate and rhythm and no murmur    Abdomen: Abdomen soft, non-tender.  BS normal. No masses,  No organomegaly  Fundus: non-tender, normal size, firm, below umbilicus  Perineum: not inspected  Extremities:  no calf tenderness, non edematous    Assessment:  Driss Reillydavid is a 26 y.o. female , 6 weeks Post Partum s/p spontaneous vaginal delivery on 3/9/21   - Stable, discontinue routine post partum care    Patient Active Problem List    Diagnosis Date Noted     3/9/21 F APG  Wt 6#4 2021    Family history of neurofibromatosis 2021    Irritable bowel syndrome 2019     Return in about 1 week (around 2021) for IUD insertion.       Ellen Lopezi [facility] Ob/Gyn   2021, 2:05 PM

## 2021-04-27 ENCOUNTER — OFFICE VISIT (OUTPATIENT)
Dept: OBGYN CLINIC | Age: 26
End: 2021-04-27
Payer: COMMERCIAL

## 2021-04-27 VITALS
BODY MASS INDEX: 22.82 KG/M2 | HEIGHT: 66 IN | WEIGHT: 142 LBS | SYSTOLIC BLOOD PRESSURE: 118 MMHG | DIASTOLIC BLOOD PRESSURE: 70 MMHG | HEART RATE: 70 BPM

## 2021-04-27 DIAGNOSIS — Z30.430 ENCOUNTER FOR IUD INSERTION: Primary | ICD-10-CM

## 2021-04-27 PROCEDURE — 58300 INSERT INTRAUTERINE DEVICE: CPT | Performed by: STUDENT IN AN ORGANIZED HEALTH CARE EDUCATION/TRAINING PROGRAM

## 2021-04-27 ASSESSMENT — PATIENT HEALTH QUESTIONNAIRE - PHQ9
SUM OF ALL RESPONSES TO PHQ QUESTIONS 1-9: 0
SUM OF ALL RESPONSES TO PHQ9 QUESTIONS 1 & 2: 0
SUM OF ALL RESPONSES TO PHQ QUESTIONS 1-9: 0

## 2021-04-27 NOTE — PROGRESS NOTES
[facility] PHYSICIANS  [facility] OB/GYN Cesilia Hubbard  [address] [facility] [address] [facility] [address]  Dept: [phone]      OB/GYN   Procedure Note    Gallito Mathur  4/27/2021                       Primary Care Physician: Armando Douglas MD      Subjective:   Jerry Hidalgo Demdavid 27 y.o. female Froy Gutierres is here for previously scheduled Mirena IUD due to history of dysmenorrhea. No LMP recorded. . She has no complaints today. She is known to have painful menses that interfere with her ADL's. She has tried NSAIDS in the past with little success. She wishes to continue to utilize barrier contraception for family planning and STD precautions. The side effect profile of the IUD was reviewed. All other forms of family planning and options for treatment of her dysmenorrhea were reviewed with the patient. The patient denies use of tobacco products. The patient denies any family member or herself as having a blood clot in their leg, lung, brain or that any member of her family has had a sudden cardiac death under the age of 37 yo. Vitals:   Blood pressure 118/70, pulse 70, height 5' 6\" (1.676 m), weight 142 lb (64.4 kg), not currently breastfeeding. Lab:  Pregnancy Test:  Lab Results   Component Value Date    PREGTESTUR POSITIVE 09/03/2020    HCG NEGATIVE 01/13/2017         Procedure: Insertion of Mirena IUD    Indications: Dysmenorrhea     Mirena IUD Insertion   The patient was counseled on the procedure. Risks, benefits and alternatives were reviewed. The patient is aware that this is diagnostic and not curative and a second procedure may be needed. A consent was reviewed and obtained. The patient was positioned comfortably on the exam table. After a bi-manual exam; the uterus was found to be anteverted. There was no cervical motion tenderness or adnexal masses and the bladder was smooth, non-tender and without palpable masses. A sterile speculum was placed without incident. The cervix was then cleansed with betadine The Mirena IUD was opened and loaded into the delivery system. An Allis clamp was placed for stabilization. The wand was inserted just past the internal portio and the button was retracted to the first line. The wand was held in place for 10 seconds and then the button was retracted to its final position while the IUD was moved to the fundus, measuring 7 cm. The string was trimmed in standard fashion and the Allis clamp was removed. The speculum was then removed. The patient tolerated the procedure without difficulty. Assessment & Plan:  Agnes Foley Demecs 27 y.o. female   Patient Active Problem List    Diagnosis Date Noted     3/9/21 F APG  Wt 6#4 2021    Family history of neurofibromatosis 2021    Irritable bowel syndrome 2019       Family Planning Counseling Completed  Barrier Recommendations discussed  Removal of the Mirena IUD will be in 6 years on 27   Annual health follow-up  2021    Return in about 6 weeks (around 2021) for IUD String Check. The patient was counseled on follow up and home care with restrictions noted. Post procedure restrictions were reviewed and given to the patient. She was instructed to use barrier protection for sexually transmitted disease prevention as well as string checks/timing. She was instructed to abstain for two weeks. She is to notify the office or go to the nearest Emergency Department if she experiences Abdominal Pain, Temperatures more than 101 F, Odiferous Vaginal Discharge, Dizziness or Shortness of breath. The patient was counseled on the need for string checks after her menses and coital activity. She is to notify the office if she can not locate the IUD string at anytime. She is aware that she will need a speculum exam and possibly an ultrasound to confirm the proper orientation of the IUD.         Ellen Kasper Koi 135[address] OB/GYN, [facility]  2021 8:47 AM

## 2021-05-25 ENCOUNTER — TELEPHONE (OUTPATIENT)
Dept: OBGYN CLINIC | Age: 26
End: 2021-05-25

## 2021-05-25 NOTE — TELEPHONE ENCOUNTER
Alcohol (when very intoxicated) can cause the levels of buspar in blood to increase and cause more a sedative effect already notorious to alcohol. This is more pronounced with patients that have hepatic (liver) impairment at baseline. Just use good judgement and be aware that patient may become more sleepy with buspar and alcohol in combo.     Ellen Valencia 1357 Ob/Gyn   5/25/2021, 5:06 PM

## 2021-08-02 ENCOUNTER — HOSPITAL ENCOUNTER (OUTPATIENT)
Age: 26
Discharge: HOME | End: 2021-08-02

## 2021-08-11 ENCOUNTER — HOSPITAL ENCOUNTER (OUTPATIENT)
Age: 26
Discharge: HOME | End: 2021-08-11

## 2021-08-11 PROCEDURE — 86481 TB AG RESPONSE T-CELL SUSP: CPT

## 2021-08-15 LAB — T-SPOT TB TEST: NORMAL

## 2021-10-21 ENCOUNTER — PROCEDURE VISIT (OUTPATIENT)
Dept: OBGYN CLINIC | Age: 26
End: 2021-10-21
Payer: COMMERCIAL

## 2021-10-21 VITALS
BODY MASS INDEX: 21.21 KG/M2 | DIASTOLIC BLOOD PRESSURE: 70 MMHG | WEIGHT: 132 LBS | SYSTOLIC BLOOD PRESSURE: 102 MMHG | HEIGHT: 66 IN

## 2021-10-21 DIAGNOSIS — Z30.431 SURVEILLANCE FOR BIRTH CONTROL, INTRAUTERINE DEVICE: ICD-10-CM

## 2021-10-21 DIAGNOSIS — Z30.430 ENCOUNTER FOR IUD INSERTION: Primary | ICD-10-CM

## 2021-10-21 PROCEDURE — 58300 INSERT INTRAUTERINE DEVICE: CPT | Performed by: OBSTETRICS & GYNECOLOGY

## 2021-10-21 NOTE — PROGRESS NOTES
[facility]   Procedure Note    Irene Vasquez  10/21/2021                       Primary Care Physician: Too Pearl MD      Subjective:   Francisco Jacobs Demdavid 27 y.o. female Arnoldo Delgado is here for previously scheduled IUD placement due to dysmenorrhea. Patient's last menstrual period was 10/14/2021 (exact date). . She has no complaints today. She is known to have painful menses that interfere with her ADL's. She has tried NSAIDS in the past without success. She wishes to continue to utilize barrier contraception for family planning and STD precautions. The side affect profile of the IUD was reviewed. All other forms of family planning and options for treatment of her dysmennorhea were reviewed with the patient. Vitals:   Vitals:    10/21/21 1309   BP: 102/70   Site: Right Upper Arm   Position: Sitting   Cuff Size: Small Adult   Weight: 132 lb (59.9 kg)   Height: 5' 6\" (1.676 m)         Lab:  Pregnancy Test:  Lab Results   Component Value Date    PREGTESTUR POSITIVE 09/03/2020    HCG NEGATIVE 01/13/2017         Procedure: Insertion of Mirena IUD    Indications: Dysmenorrhea       Procedure Details: The patient was counseled on the procedure. Risks, benefits and alternatives were reviewed. The patient is aware that this is diagnostic and not curative and a second procedure may be needed. A consent was reviewed and obtained. The patient was positioned comfortably on the exam table. After a bi-manual exam; the uterus was found to be  anteverted with a size of 7 cm. There was no cervical motion tenderness or adnexal masses and the bladder was smooth, non-tender and without palpable masses. A sterile speculum was placed without incident. The cervix was then cleansed with Betadine and the uterus was sounded to 7 cm. The Mirena IUD was opened and loaded into the delivery system. The wand was inserted just past the internal portio and the button was retracted to the first line.  The wand was held in place for 10 seconds and then the button was retracted to its final position while the IUD was moved to the fundus. The string was trimmed in standard fashion. The speculum was then removed. The patient tolerated the procedure without difficulty. Assessment & Plan:  Rekha Brown 27 y.o. female  with Dysmenorrhea    - Mirena IUD placeed without difficulty     Lot number: [id number]    Expiration 2023    Patient Active Problem List    Diagnosis Date Noted     3/9/21 F APG  Wt 6#4 2021    Family history of neurofibromatosis 2021    Irritable bowel syndrome 2019       Family Planning Counseling Completed  Barrier Recommendations reviewed  Removal of the Mirena IUD will be in 7 years on 10/2028     The patient was counseled on follow up and home care with restrictions noted. Post procedure restrictions were reviewed and given to the patient. She was instructed to use barrier protection for sexually transmitted disease prevention as well as string checks/timing. She was instructed to abstain for two weeks and use freddie-pads for the first 8 weeks post procedure. She is to notify the office or go to the nearest Emergency Department if she experiences Abdominal Pain, Temperatures more than 101 F, Odiferous Vaginal Discharge, Dizziness or Shortness of breath. The patient was counseled on the need for string checks after her menses and coital activity. She is to notify the office if she can not locate the IUD string at anytime. She is aware that she will need a speculum exam and possibly an ultrasound to confirm the proper orientation of the IUD.         [address] [facility], DO  Ob/Gyn  10/21/2021, 1:30 PM

## 2022-04-13 ENCOUNTER — TELEMEDICINE (OUTPATIENT)
Dept: FAMILY MEDICINE CLINIC | Age: 27
End: 2022-04-13
Payer: COMMERCIAL

## 2022-04-13 DIAGNOSIS — Z00.00 PREVENTATIVE HEALTH CARE: ICD-10-CM

## 2022-04-13 DIAGNOSIS — I83.893 VARICOSE VEINS OF BILATERAL LOWER EXTREMITIES WITH OTHER COMPLICATIONS: Primary | ICD-10-CM

## 2022-04-13 DIAGNOSIS — Z76.89 ENCOUNTER TO ESTABLISH CARE: ICD-10-CM

## 2022-04-13 DIAGNOSIS — E55.9 VITAMIN D DEFICIENCY: ICD-10-CM

## 2022-04-13 DIAGNOSIS — K58.9 IRRITABLE BOWEL SYNDROME, UNSPECIFIED TYPE: ICD-10-CM

## 2022-04-13 DIAGNOSIS — F41.9 ANXIETY: ICD-10-CM

## 2022-04-13 PROCEDURE — 99203 OFFICE O/P NEW LOW 30 MIN: CPT | Performed by: FAMILY MEDICINE

## 2022-04-13 ASSESSMENT — PATIENT HEALTH QUESTIONNAIRE - PHQ9
SUM OF ALL RESPONSES TO PHQ QUESTIONS 1-9: 1
2. FEELING DOWN, DEPRESSED OR HOPELESS: 0
SUM OF ALL RESPONSES TO PHQ9 QUESTIONS 1 & 2: 1
1. LITTLE INTEREST OR PLEASURE IN DOING THINGS: 1
SUM OF ALL RESPONSES TO PHQ QUESTIONS 1-9: 1

## 2022-04-13 ASSESSMENT — ENCOUNTER SYMPTOMS
RHINORRHEA: 0
SORE THROAT: 0
CONSTIPATION: 0
ABDOMINAL PAIN: 0
EYE REDNESS: 0
NAUSEA: 0
COLOR CHANGE: 0
SINUS PRESSURE: 0
CHEST TIGHTNESS: 0
COUGH: 0
SHORTNESS OF BREATH: 0
ABDOMINAL DISTENTION: 0
RECTAL PAIN: 0
VOMITING: 0
BACK PAIN: 0
STRIDOR: 0
DIARRHEA: 0
BLOOD IN STOOL: 0
WHEEZING: 0
TROUBLE SWALLOWING: 0

## 2022-04-13 NOTE — PROGRESS NOTES
2022    TELEHEALTH EVALUATION -- Audio/Visual (During [id number]-[id number] public health emergency)      Mario Macario (:  1995) is a 32 y.o. female,Established patient, here for evaluation of the following chief complaint(s): Establish Care, Leg Pain, and Varicose Veins        ASSESSMENT/PLAN:    1. Varicose veins of bilateral lower extremities with other complications  Continue using compression stockings referral placed to vein specialist.    -     [facility] - Sylvia Ren MD, Vascular Surgery, [address]; Future  -     Comprehensive Metabolic Panel; Future  -     TSH; Future  2. Anxiety  Fairly stable continue laxation techniques not on any medications  3. Irritable bowel syndrome, unspecified type  Controlled on diet  -     Vitamin D 25 Hydroxy; Future  -     TSH; Future  4. Vitamin D deficiency  Recheck vitamin D  -     Vitamin D 25 Hydroxy; Future  5. Preventative health care  -     Varicella Zoster Antibody, IgG; Future      Chalsea received counseling on the following healthy behaviors: nutrition, exercise and medication adherence  Reviewed prior labs and health maintenance  Discussed use, benefit, and side effects of prescribed medications. Barriers to medication compliance addressed. Patient given educational materials - see patient instructions  All patient questions answered. Patient voiced understanding. The patient's past medical,surgical, social, and family history as well as her current medications and allergies were reviewed as documented in today's encounter. Medications, labs, diagnostic studies, consultations and follow-up as documented in this encounter. No follow-ups on file. Data Unavailable    No future appointments.       SUBJECTIVE/OBJECTIVE:  Kayli Brown (:  1995) has requested an audio/video evaluation for the following concern(s):Establish Care, Leg Pain, and Varicose Veins    Patient is scheduled to establish, Has not seen any PCP in last few years. Patient reports she has history of bilateral leg pain associated with varicose veins. 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 Patient reports she has this problem since last few years but recently it is getting worse. She feels a throbbing sensation and swelling of both lower extremities by the end of the day. She has tried compression stockings but they do not help. The veins are more prominent denies any skin discoloration, any ulcerations. Patient was not able to send pictures today will send them later. Patient reports she wants to try some other treatment options as compression stockings has not worked. Patient has history of anxiety which is fairly stable, not taking any medications. She was taking BuSpar as needed basis. Irritable bowel syndrome not on any medications reports she is on diet control and her symptoms are fairly stable. She has history of vitamin D deficiency no recent vitamin D check. PHQ Scores 4/13/2022 4/27/2021 11/16/2020   PHQ2 Score 1 0 0   PHQ9 Score 1 0 0     Interpretation of Total Score Depression Severity: 1-4 = Minimal depression, 5-9 = Mild depression, 10-14 = Moderate depression, 15-19 = Moderately severe depression, 20-27 = Severe depression    Ht Readings from Last 3 Encounters:   10/21/21 5' 6\" (1.676 m)   04/27/21 5' 6\" (1.676 m)   04/20/21 5' 6\" (1.676 m)     Wt Readings from Last 3 Encounters:   10/21/21 132 lb (59.9 kg)   04/27/21 142 lb (64.4 kg)   04/20/21 141 lb (64 kg)         Review of Systems   Constitutional: Negative for activity change, appetite change, fatigue, fever and unexpected weight change. HENT: Negative for congestion, ear pain, postnasal drip, rhinorrhea, sinus pressure, sore throat and trouble swallowing. Eyes: Negative for redness and visual disturbance. Respiratory: Negative for cough, chest tightness, shortness of breath, wheezing and stridor.     Cardiovascular: Negative for chest pain, palpitations and leg swelling. Gastrointestinal: Negative for abdominal distention, abdominal pain, blood in stool, constipation, diarrhea, nausea, rectal pain and vomiting. Endocrine: Negative for polydipsia, polyphagia and polyuria. Genitourinary: Negative for difficulty urinating, flank pain, frequency and urgency. Musculoskeletal: Positive for arthralgias. Negative for back pain, gait problem, myalgias and neck pain. Bilateral leg pain   Skin: Negative for color change, rash and wound. Allergic/Immunologic: Negative for food allergies and immunocompromised state. Neurological: Negative for dizziness, speech difficulty, weakness, light-headedness, numbness and headaches. Psychiatric/Behavioral: Negative for agitation, behavioral problems, decreased concentration, dysphoric mood, hallucinations, sleep disturbance and suicidal ideas. The patient is not nervous/anxious. Prior to Visit Medications    Medication Sig Taking? Authorizing Provider   ibuprofen (ADVIL;MOTRIN) 800 MG tablet Take 1 tablet by mouth every 8 hours as needed for Pain Yes Farooq Reyez,    levonorgestrel (MIRENA, 52 MG,) IUD 52 mg 1 each by Intrauterine route once for 1 dose Barrier contraception is recommended.   Jessy Hardin,    Prenatal Vit-Fe Fumarate-FA (PNV PRENATAL PLUS MULTIVITAMIN) 27-1 MG TABS TAKE 1 TABLET BY MOUTH ONE TIME A DAY  Historical Provider, MD       Social History     Tobacco Use    Smoking status: Never Smoker    Smokeless tobacco: Never Used   Vaping Use    Vaping Use: Never used   Substance Use Topics    Alcohol use: Not Currently     Comment: socially    Drug use: No          PHYSICAL EXAMINATION:    Vital Signs: (As obtained by patient/caregiver or practitioner observation)  -vital signs stable and within normal limits except   Patient-Reported Vitals 4/13/2022   Patient-Reported Weight 120 lbs   Patient-Reported Height 5'6\"           Intensity of pain is: Constitutional: [x] Appears well-developed and well-nourished [x] No apparent distress      [] Abnormal-       Mental status  [x] Alert and awake  [x] Oriented to person/place/time [x]Able to follow commands      Eyes:  EOM    [x]  Normal  [] Abnormal-  Sclera  [x]  Normal  [] Abnormal -         Discharge [x]  None visible  [] Abnormal -    HENT:   [x] Normocephalic, atraumatic. [] Abnormal   [x] Mouth/Throat: Mucous membranes are moist.     External Ears [x] Normal  [] Abnormal-     Neck: [x] No visualized mass     Pulmonary/Chest: [x] Respiratory effort normal.  [x] No visualized signs of difficulty breathing or respiratory distress        [] Abnormal        Musculoskeletal:   [x] Normal gait with no signs of ataxia         [x] Normal range of motion of neck        [] Abnormal-       Neurological:        [x] No Facial Asymmetry (Cranial nerve 7 motor function) (limited exam to video visit)          [x] No gaze palsy        [] Abnormal-            Skin:        [x] No significant exanthematous lesions or discoloration noted on facial skin         [] Abnormal-            Psychiatric:      [x] No Hallucinations       [x]Mood is normal      [x]Behavior is normal      [x]Judgment is normal      [x]Thought content is normal       [] Abnormal-     Other pertinent observable physical exam findings-     Due to this being a TeleHealth encounter, evaluation of the following organ systems is limited: Vitals/Constitutional/EENT/Resp/CV/GI//MS/Neuro/Skin/Heme-Lymph-Imm. I personally reviewed most recent labs reviewed with the patient and all questions fully answered.      Otherwise labs within normal limits        Lab Results   Component Value Date    WBC 7.7 03/08/2021    HGB 10.7 (L) 03/08/2021    HCT 32.4 (L) 03/08/2021    MCV 89.8 03/08/2021     03/08/2021       Lab Results   Component Value Date     03/24/2016    K 4.0 03/24/2016     03/24/2016    CO2 24 03/24/2016    BUN 16 03/24/2016 CREATININE 0.68 03/24/2016    GLUCOSE 87 12/21/2020    GLUCOSE 80 03/24/2016    CALCIUM 9.4 03/24/2016        Lab Results   Component Value Date    ALT 11 03/24/2016    AST 13 03/24/2016    ALKPHOS 36 03/24/2016    BILITOT 0.61 03/24/2016       Lab Results   Component Value Date    TSH 1.57 03/24/2016       No results found for: CHOL  No results found for: TRIG  No results found for: HDL  No results found for: LDLCALC, LDLCHOLESTEROL    No results found for: CHOLHDLRATIO    No results found for: LABA1C      No results found for: IHNMJIVA82    Lab Results   Component Value Date    VITD25 10.8 (L) 03/24/2016       No orders of the defined types were placed in this encounter.       Orders Placed This Encounter   Procedures    CBC     Standing Status:   Future     Standing Expiration Date:   4/13/2023    Comprehensive Metabolic Panel     Fasting 8 hrs     Standing Status:   Future     Standing Expiration Date:   4/13/2023    Vitamin D 25 Hydroxy     Standing Status:   Future     Standing Expiration Date:   4/13/2023    TSH     Standing Status:   Future     Standing Expiration Date:   4/13/2023    Varicella Zoster Antibody, IgG     Standing Status:   Future     Standing Expiration Date:   4/13/2023    BRAYAN - Acosta Adrian MD, Vascular Surgery, [place]     Referral Priority:   Routine     Referral Type:   Eval and Treat     Referral Reason:   Specialty Services Required     Referred to Provider:   Jonah Mrax MD     Requested Specialty:   Vascular Surgery     Number of Visits Requested:   1       Medications Discontinued During This Encounter   Medication Reason    Doxylamine Succinate, Sleep, (UNISOM PO) Therapy completed    Pyridoxine HCl (VITAMIN B-6 PO) Therapy completed    Ferrous Sulfate (IRON PO) Therapy completed    Phenylephrine-Acetaminophen 5-325 MG TABS Therapy completed    docusate sodium (COLACE) 100 MG capsule LIST CLEANUP    busPIRone (BUSPAR) 5 MG tablet Therapy completed    levonorgestrel (MIRENA) IUD 52 mg 1 each Therapy completed              Moriahabhi Fam Brown, was evaluated through a synchronous (real-time) audio-video encounter. The patient (or guardian if applicable) is aware that this is a billable service, which includes applicable co-pays. The virtual visit was conducted with patient's (and/or legal guardian consent). Verbal consent to proceed has been obtained within the past 12 months. The visit was conducted pursuant to the emergency declaration under the [address] [place] [address] authority and the [employer] Act. Patient identification was verified    Patient was alone   Provider was located at primary practice location. The patient was located at home, in a state where the provider was licensed to provide care. On this date 4/13/2022 I have spent 35 minutes reviewing previous notes, test results and face to face with the patient discussing the diagnosis and importance of compliance with the treatment plan as well as documenting on the day of the visit. --Anna Steve MD on 4/13/2022 at 2:29 PM    An electronic signature was used to authenticate this note.

## 2022-12-29 ENCOUNTER — E-VISIT (OUTPATIENT)
Dept: FAMILY MEDICINE CLINIC | Age: 27
End: 2022-12-29

## 2022-12-29 DIAGNOSIS — H01.004 BLEPHARITIS OF LEFT UPPER EYELID, UNSPECIFIED TYPE: Primary | ICD-10-CM

## 2022-12-29 RX ORDER — BACITRACIN ZINC AND POLYMYXIN B SULFATE 500; 10000 [USP'U]/G; [USP'U]/G
OINTMENT OPHTHALMIC 2 TIMES DAILY
Qty: 1 EACH | Refills: 0 | Status: SHIPPED | OUTPATIENT
Start: 2022-12-29 | End: 2023-01-08

## 2022-12-29 NOTE — PROGRESS NOTES
[address] [facility] [address]  [address] [place] [id number]  Dept: [phone]      E-VISIT PROGRESS NOTE    HPI  Matthew Torrez is a 32 y.o. female patient  has requested an audio/video evaluation for the following concern(s): See below    PATIENT MESSAGE  Patient Questionnaire Submission  --------------------------------     Questionnaire: Pink Eye Evisit     Question: Are you pregnant or trying to become pregnant? Answer:   No     Question: Are you breastfeeding? Answer:   No     Question: Have you ever been diagnosed with a chronic eye condition such as glaucoma, cataracts, iritis, recurrent stye, or had previous eye surgery? Answer:   No     Question: Are you using any prescription eye drops? Answer:   No     Question: Are you using any Over-the-Counter eye drops? Answer:   No     Question: If yes, please list  Answer:        Question: Describe your eye symptoms  Answer:   Blepharitis, inflammed and reddened eyelid     Question: Does this affect the left eye, right eye or both? Answer:   Left     Question: How long have you had this eye problem? Answer:   2-3 days     Question: Along with this eye problem, are you also experiencing any new rash, swelling or redness in the facial area around your eyes? Answer:   No     Question: Have you experienced any severe headaches since your eye symptoms started? Answer:   No     Question: Have you had any fevers associated with this eye condition? Answer:   No, I have not had any fevers     Question: Have you had any recent illnesses? Answer:   No     Question: If yes, please explain  Answer:        Question: Have you been experiencing any flashes, new or worsening floaters, or new reduction in your eyesight? Answer:   No     Question: Have you experienced any recent eye injury? Answer:   No     Question: Is there a chance that a foreign body got into your eye(s)?   Answer:   No     Question: Does episode? Answer:   Years ago     Question: Was there a diagnosis? Answer:   No     Question: If yes, what was the diagnosis? Answer:        Question: What treatments have worked in the past?  Answer:   Did not need treatment then, went away on its own     Question: What has not worked? Answer:   Warm compresses no longer reducing swelling     Questionnaire: Pink Eye Evisit     Question: Are you currently using any medications or other treatments for these symptoms? Answer:   No     Question: If yes, please list the names of any medications or other treatments that you have tried. Answer:        Question: Are these treatments providing any relief? Answer:        Question: Is there anything else you would like to share about your eye problem? Answer:   No     Question: Please attach up to two images of your eye(s)  Answer:   Patient Upload            Patient Upload  Review of Systems   Past Medical History:   Diagnosis Date    Abnormal Pap smear of cervix     Anxiety     Mental disorder     DELORES      No Known Allergies      Medication List            Accurate as of December 29, 2022 11:05 AM. If you have any questions, ask your nurse or doctor. START taking these medications      bacitracin-polymyxin b [id number] UNIT/GM ophthalmic ointment  Commonly known as: POLYSPORIN  Place into the left eye 2 times daily for 10 days Every 12 hours.   Started by: BERNA Robles CNP            CONTINUE taking these medications      ibuprofen 800 MG tablet  Commonly known as: ADVIL;MOTRIN  Take 1 tablet by mouth every 8 hours as needed for Pain     PNV Prenatal Plus Multivitamin 27-1 MG Tabs            STOP taking these medications      Mirena (52 MG) IUD 52 mg  Generic drug: levonorgestrel  Stopped by: BERNA Robles CNP               Where to Get Your Medications        These medications were sent to [facility] [address], [address] [place] [address],  [address] - f [phone] [facility] Gastelum, [address] [place] [id number]      Phone: [phone]   bacitracin-polymyxin b [id number] UNIT/GM ophthalmic ointment          Based on the symptoms reported by the patient, it seems that patient has   CURRENT MEDS W/ ASSOC DIAG           Start Date End Date     ibuprofen (ADVIL;MOTRIN) 800 MG tablet  03/09/21  --     Take 1 tablet by mouth every 8 hours as needed for Pain     Associated Diagnoses:  --     levonorgestrel (MIRENA) IUD 52 mg 1 each  04/27/21  --     1 each, Intrauterine, CONTINUOUS, Starting Tue 4/27/21 at 1200 Siss[address]  Diagnoses:  Encounter for IUD insertion     Prenatal Vit-Fe Fumarate-FA (PNV PRENATAL PLUS MULTIVITAMIN) 27-1 MG TABS  08/09/20  --     Associated Diagnoses:  --           Orders Placed This Encounter   Medications    bacitracin-polymyxin b (POLYSPORIN) [id number] UNIT/GM ophthalmic ointment     Sig: Place into the left eye 2 times daily for 10 days Every 12 hours. Dispense:  1 each     Refill:  0     No orders of the defined types were placed in this encounter. Diagnosis Orders   1. Blepharitis of left upper eyelid, unspecified type  bacitracin-polymyxin b (POLYSPORIN) [id number] UNIT/GM ophthalmic ointment           Patient was advised to contact me if symptoms not improving or getting worse, or to call us for an appointment. On this date 12/29/2022 I have spent 11 minutes reviewing previous notes, test results and face to face with the patient discussing the diagnosis and importance of compliance with the treatment plan as well as documenting on the day of the visit. This note was completed by using the assistance of a speech-recognition program. However, inadvertent computerized transcription errors may be present.  Although every effort was made to ensure accuracy, no guarantees can be provided that every mistake has been identified and corrected by editing   Electronically signed by BERNA Fatima CNP on 12/29/22 at 11:02 AM EST

## 2023-01-09 ENCOUNTER — TELEPHONE (OUTPATIENT)
Dept: FAMILY MEDICINE CLINIC | Age: 28
End: 2023-01-09

## 2023-01-09 NOTE — TELEPHONE ENCOUNTER
Pt called in stating that her eye infection is not improving and she is experiencing side effects from medication stating her lower and upper eyelids feel like they are burning. Pt is inquiring if she could be put on an oral antibiotic to help clear up the infection.

## 2023-01-09 NOTE — TELEPHONE ENCOUNTER
Patient not seen by me either.   Patient did have an E-visit with Alivia Yun in December    Please advise the patient to go to the urgent care and stop using the eyedrops that is burning her eyes  Future Appointments   Date Time Provider Nikki Scott   1/18/2023  2:30 PM DO [email or web address]

## 2023-01-10 ENCOUNTER — OFFICE VISIT (OUTPATIENT)
Dept: FAMILY MEDICINE CLINIC | Age: 28
End: 2023-01-10
Payer: COMMERCIAL

## 2023-01-10 VITALS — SYSTOLIC BLOOD PRESSURE: 108 MMHG | HEART RATE: 72 BPM | TEMPERATURE: 98.2 F | DIASTOLIC BLOOD PRESSURE: 76 MMHG

## 2023-01-10 DIAGNOSIS — H10.9 BACTERIAL CONJUNCTIVITIS OF LEFT EYE: Primary | ICD-10-CM

## 2023-01-10 DIAGNOSIS — H01.00B BLEPHARITIS OF BOTH UPPER AND LOWER EYELID OF LEFT EYE, UNSPECIFIED TYPE: ICD-10-CM

## 2023-01-10 PROCEDURE — 99213 OFFICE O/P EST LOW 20 MIN: CPT

## 2023-01-10 RX ORDER — ERYTHROMYCIN 5 MG/G
1.5 OINTMENT OPHTHALMIC EVERY 6 HOURS
Qty: 7 G | Refills: 0 | Status: SHIPPED | OUTPATIENT
Start: 2023-01-10 | End: 2023-01-17

## 2023-01-10 RX ORDER — MOXIFLOXACIN 5 MG/ML
1 SOLUTION/ DROPS OPHTHALMIC 3 TIMES DAILY
Qty: 1.5 ML | Refills: 0 | Status: SHIPPED | OUTPATIENT
Start: 2023-01-10 | End: 2023-01-17

## 2023-01-10 ASSESSMENT — ENCOUNTER SYMPTOMS
NAUSEA: 0
VOMITING: 0
CHEST TIGHTNESS: 0
FOREIGN BODY SENSATION: 0
EYE REDNESS: 1
VOICE CHANGE: 0
DOUBLE VISION: 0
EYE ITCHING: 1
SHORTNESS OF BREATH: 0
PHOTOPHOBIA: 0
EYE DISCHARGE: 0
EYE PAIN: 0
TROUBLE SWALLOWING: 0
BLURRED VISION: 0

## 2023-01-10 ASSESSMENT — VISUAL ACUITY: OU: 1

## 2023-01-10 NOTE — PROGRESS NOTES
[facility]  [facility] [address] [facility] [address]  Dept: [phone]  Dept Fax: [phone]    Sera Nunez is a 25 y.o. female who presents to the urgent care today for her medical conditions/complaints as notedbelow. Sera Nunez is c/o of Stye (Onset since 12/29 , tx for it but now having burning sensation on top of her left eye lid. )      HPI:     12/29- Patient had a mychart visit with PCP and was given eye gtts for blepharitis. She was taking polysporin but was having side effects of burning. She reports having a stye at the time but has resolved on its own. Patient is a contact wearer and is currently wearing her contacts. Eye Problem   The left eye is affected. This is a new problem. Episode onset: in the past 10 days. The problem occurs constantly. The problem has been waxing and waning. There was no injury mechanism. The patient is experiencing no pain. There is No known exposure to pink eye. She Wears contacts. Associated symptoms include eye redness and itching. Pertinent negatives include no blurred vision, eye discharge, double vision, fever, foreign body sensation, nausea, photophobia, recent URI or vomiting. Treatments tried: polysporin. Past Medical History:   Diagnosis Date    Abnormal Pap smear of cervix     Anxiety     Mental disorder     DELORES        Current Outpatient Medications   Medication Sig Dispense Refill    erythromycin (ROMYCIN) 5 MG/GM ophthalmic ointment Place 1.5 cm into the left eye in the morning and 1.5 cm at noon and 1.5 cm in the evening and 1.5 cm before bedtime. Do all this for 7 days.  7 g 0    moxifloxacin (VIGAMOX) 0.5 % ophthalmic solution Place 1 drop into the left eye 3 times daily for 7 days 1.5 mL 0    ibuprofen (ADVIL;MOTRIN) 800 MG tablet Take 1 tablet by mouth every 8 hours as needed for Pain (Patient not taking: Reported on 1/10/2023) 30 tablet 0    Prenatal Vit-Fe Fumarate-FA (PNV PRENATAL PLUS MULTIVITAMIN) 27-1 MG TABS TAKE 1 TABLET BY MOUTH ONE TIME A DAY (Patient not taking: Reported on 1/10/2023)       Current Facility-Administered Medications   Medication Dose Route Frequency Provider Last Rate Last Admin    levonorgestrel (MIRENA) IUD 52 mg 1 each  1 each IntraUTERine Continuous Cheo French, DO   1 each at 04/27/21 0858     No Known Allergies    Subjective:      Review of Systems   Constitutional:  Negative for activity change, appetite change, fatigue and fever. HENT:  Negative for congestion, trouble swallowing and voice change. Eyes:  Positive for redness and itching. Negative for blurred vision, double vision, photophobia, pain, discharge and visual disturbance. Respiratory:  Negative for chest tightness and shortness of breath. Gastrointestinal:  Negative for nausea and vomiting. Genitourinary:  Negative for difficulty urinating and dysuria. Skin:  Negative for rash and wound. Neurological:  Negative for dizziness and headaches. All other systems reviewed and are negative. 14 systems reviewed and negative except as listed in HPI. Objective:     Physical Exam  Vitals reviewed. Constitutional:       General: She is not in acute distress. Appearance: Normal appearance. She is not ill-appearing, toxic-appearing or diaphoretic. HENT:      Head: Normocephalic and atraumatic. No right periorbital erythema or left periorbital erythema. Jaw: No tenderness or pain on movement. Right Ear: External ear normal.      Left Ear: External ear normal.      Nose: Nose normal. No congestion or rhinorrhea. Eyes:      General: Vision grossly intact. No visual field deficit or scleral icterus. Right eye: No foreign body, discharge or hordeolum. Left eye: No foreign body, discharge or hordeolum. Extraocular Movements: Extraocular movements intact.       Right eye: Normal extraocular motion and no nystagmus. Left eye: Normal extraocular motion and no nystagmus. Conjunctiva/sclera:      Right eye: Right conjunctiva is not injected. No chemosis, exudate or hemorrhage. Left eye: Left conjunctiva is injected. Exudate present. No chemosis or hemorrhage. Pupils: Pupils are equal, round, and reactive to light. Pupils are equal.      Funduscopic exam:     Right eye: Red reflex present. Left eye: Red reflex present. Comments: Blepharitis, conjunctiva red with contact lens in place   Cardiovascular:      Rate and Rhythm: Normal rate. Pulmonary:      Effort: Pulmonary effort is normal.   Musculoskeletal:         General: No swelling or tenderness. Normal range of motion. Cervical back: Normal range of motion and neck supple. No rigidity or tenderness. Lymphadenopathy:      Cervical: No cervical adenopathy. Skin:     General: Skin is warm and dry. Capillary Refill: Capillary refill takes less than 2 seconds. Findings: No erythema or rash. Neurological:      Mental Status: She is alert and oriented to person, place, and time. Motor: No weakness. Coordination: Coordination normal.      Gait: Gait normal.   Psychiatric:         Mood and Affect: Mood normal.         Behavior: Behavior normal.         Thought Content: Thought content normal.         Judgment: Judgment normal.     /76 (Site: Left Upper Arm, Position: Sitting, Cuff Size: Medium Adult)   Pulse 72   Temp 98.2 °F (36.8 °C) (Infrared)     Assessment:       Diagnosis Orders   1. Bacterial conjunctivitis of left eye  moxifloxacin (VIGAMOX) 0.5 % ophthalmic solution      2.  Blepharitis of both upper and lower eyelid of left eye, unspecified type  erythromycin (ROMYCIN) 5 MG/GM ophthalmic ointment          Plan:   -Based on the clinical exam findings-- I will treat this as a contact wearer bacterial conjunctivitis & blepharitis   -Take out contacts, wear prescription glasses  -Advised to not wear contacts until antibiotics are completed  -Follow up with optometry  -Erythromycin and vigamox gtts   -Warm compresses to the eyes if desired. -Good hand hygiene encouraged.  -Patient agreeable to treatment plan.  -Educational materials provided on AVS.    Return if symptoms worsen or fail to improve. Orders Placed This Encounter   Medications    erythromycin (ROMYCIN) 5 MG/GM ophthalmic ointment     Sig: Place 1.5 cm into the left eye in the morning and 1.5 cm at noon and 1.5 cm in the evening and 1.5 cm before bedtime. Do all this for 7 days. Dispense:  7 g     Refill:  0    moxifloxacin (VIGAMOX) 0.5 % ophthalmic solution     Sig: Place 1 drop into the left eye 3 times daily for 7 days     Dispense:  1.5 mL     Refill:  0         Patient given educational materials - see patient instructions. Discussed use, benefit, and side effects of prescribed medications. All patient questions answered. Pt voiced understanding.     Electronically signed by BERNA Archuleta NP on 1/10/2023 at 5:01 PM

## 2023-01-18 ENCOUNTER — PROCEDURE VISIT (OUTPATIENT)
Dept: OBGYN CLINIC | Age: 28
End: 2023-01-18
Payer: COMMERCIAL

## 2023-01-18 VITALS — HEART RATE: 69 BPM | DIASTOLIC BLOOD PRESSURE: 79 MMHG | SYSTOLIC BLOOD PRESSURE: 118 MMHG

## 2023-01-18 DIAGNOSIS — Z30.432 ENCOUNTER FOR IUD REMOVAL: Primary | ICD-10-CM

## 2023-01-18 PROCEDURE — 58301 REMOVE INTRAUTERINE DEVICE: CPT | Performed by: STUDENT IN AN ORGANIZED HEALTH CARE EDUCATION/TRAINING PROGRAM

## 2023-01-18 RX ORDER — PNV NO.95/FERROUS FUM/FOLIC AC 28MG-0.8MG
1 TABLET ORAL DAILY
Qty: 30 TABLET | Refills: 8 | Status: SHIPPED | OUTPATIENT
Start: 2023-01-18

## 2023-01-18 NOTE — PROGRESS NOTES
[address] [facility]  [address] [facility], [address] [place] [address]      Procedure Note    Willma Minus  2023                       Primary Care Physician: Isaac James MD      Subjective:   Hever Brown 25 y.o. female Fredrick Traylor is here for previously scheduled IUD removal due to history of desiring pregnancy. No LMP recorded. Patient has had an implant. . She has no complaints today. Vitals:   Vitals:    23 1451   BP: 118/79   Site: Left Upper Arm   Position: Sitting   Cuff Size: Medium Adult   Pulse: 69         Procedure: IUD removal    Indications: desires pregnancy    Procedure Details: The patient was counseled on the procedure. Risks, benefits and alternatives were reviewed. The patient is aware that this is diagnostic and not curative and a second procedure may be needed. A consent was reviewed and obtained. Removal of IUD  A sterile speculum was placed without incident and the string was identified at the cervical portio. The site was then cleansed with Betadine and the string was grasped with a ring forcep and the IUD was removed without incident. The IUD was not sent to pathology. Post procedure restrictions were reviewed and given to the patient. She was instructed to use barrier protection for sexually transmitted disease prevention. Lab:  Pregnancy Test:  Lab Results   Component Value Date    PREGTESTUR POSITIVE 2020    HCG NEGATIVE 2017       Assessment & Plan:  Hever Brown 25 y.o. female   Patient Active Problem List    Diagnosis Date Noted    Varicose veins of bilateral lower extremities with other complications     Anxiety 2022    Vitamin D deficiency 2022     3/9/21 F APG 8 Wt 6#4 2021    Family history of neurofibromatosis 2021    Irritable bowel syndrome 2019       The patient was counseled on follow up and home care with restrictions noted.    Return in about 8 months (around 9/18/2023) for Annual.;     Deniz Schilling, [address] [facility]   1/18/2023, 3:12 PM

## 2023-10-03 ENCOUNTER — TELEPHONE (OUTPATIENT)
Dept: OBGYN CLINIC | Age: 28
End: 2023-10-03

## 2023-10-03 DIAGNOSIS — O21.9 NAUSEA AND VOMITING IN PREGNANCY: Primary | ICD-10-CM

## 2023-10-03 RX ORDER — PYRIDOXINE HCL (VITAMIN B6) 25 MG
25 TABLET ORAL 2 TIMES DAILY
Qty: 60 TABLET | Refills: 1 | Status: SHIPPED | OUTPATIENT
Start: 2023-10-03

## 2023-10-03 NOTE — TELEPHONE ENCOUNTER
Confirmation 10.05.23    Pt called complaining of Nausea and wanting to know what would be safe to take.   Pharm [facility]  Informed pt provider might call something in  Please advise

## 2023-10-05 ENCOUNTER — HOSPITAL ENCOUNTER (OUTPATIENT)
Age: 28
Setting detail: SPECIMEN
Discharge: HOME | End: 2023-10-05

## 2023-10-05 ENCOUNTER — OFFICE VISIT (OUTPATIENT)
Dept: OBGYN CLINIC | Age: 28
End: 2023-10-05

## 2023-10-05 VITALS — BODY MASS INDEX: 20.5 KG/M2 | WEIGHT: 127 LBS

## 2023-10-05 DIAGNOSIS — N92.6 MISSED MENSES: ICD-10-CM

## 2023-10-05 DIAGNOSIS — Z32.01 POSITIVE PREGNANCY TEST: ICD-10-CM

## 2023-10-05 DIAGNOSIS — Z98.82 H/O BREAST AUGMENTATION: ICD-10-CM

## 2023-10-05 DIAGNOSIS — Z3A.08 8 WEEKS GESTATION OF PREGNANCY: ICD-10-CM

## 2023-10-05 DIAGNOSIS — Z32.01 POSITIVE PREGNANCY TEST: Primary | ICD-10-CM

## 2023-10-05 DIAGNOSIS — Z87.59 HISTORY OF PRIOR PREGNANCY WITH IUGR NEWBORN: ICD-10-CM

## 2023-10-05 LAB
CANDIDA SPECIES: NEGATIVE
GARDNERELLA VAGINALIS: NEGATIVE
SOURCE: NORMAL
TRICHOMONAS: NEGATIVE

## 2023-10-05 SDOH — ECONOMIC STABILITY: FOOD INSECURITY: WITHIN THE PAST 12 MONTHS, YOU WORRIED THAT YOUR FOOD WOULD RUN OUT BEFORE YOU GOT MONEY TO BUY MORE.: PATIENT DECLINED

## 2023-10-05 SDOH — ECONOMIC STABILITY: FOOD INSECURITY: WITHIN THE PAST 12 MONTHS, THE FOOD YOU BOUGHT JUST DIDN'T LAST AND YOU DIDN'T HAVE MONEY TO GET MORE.: PATIENT DECLINED

## 2023-10-05 SDOH — ECONOMIC STABILITY: TRANSPORTATION INSECURITY
IN THE PAST 12 MONTHS, HAS LACK OF TRANSPORTATION KEPT YOU FROM MEETINGS, WORK, OR FROM GETTING THINGS NEEDED FOR DAILY LIVING?: NO

## 2023-10-05 SDOH — ECONOMIC STABILITY: HOUSING INSECURITY
IN THE LAST 12 MONTHS, WAS THERE A TIME WHEN YOU DID NOT HAVE A STEADY PLACE TO SLEEP OR SLEPT IN A SHELTER (INCLUDING NOW)?: NO

## 2023-10-05 SDOH — ECONOMIC STABILITY: INCOME INSECURITY: HOW HARD IS IT FOR YOU TO PAY FOR THE VERY BASICS LIKE FOOD, HOUSING, MEDICAL CARE, AND HEATING?: NOT VERY HARD

## 2023-10-05 ASSESSMENT — ENCOUNTER SYMPTOMS
CONSTIPATION: 0
NAUSEA: 1
VOMITING: 0

## 2023-10-05 ASSESSMENT — PATIENT HEALTH QUESTIONNAIRE - PHQ9
SUM OF ALL RESPONSES TO PHQ QUESTIONS 1-9: 0
SUM OF ALL RESPONSES TO PHQ9 QUESTIONS 1 & 2: 0
SUM OF ALL RESPONSES TO PHQ QUESTIONS 1-9: 0
1. LITTLE INTEREST OR PLEASURE IN DOING THINGS: 0
SUM OF ALL RESPONSES TO PHQ9 QUESTIONS 1 & 2: 0
2. FEELING DOWN, DEPRESSED OR HOPELESS: NOT AT ALL
2. FEELING DOWN, DEPRESSED OR HOPELESS: 0
1. LITTLE INTEREST OR PLEASURE IN DOING THINGS: NOT AT ALL

## 2023-10-05 NOTE — PROGRESS NOTES
dysuria, frequency, pelvic pain, vaginal bleeding and vaginal discharge. Neurological:  Negative for headaches. All other systems reviewed and are negative. Physical Exam  Constitutional:       General: She is not in acute distress. Appearance: Normal appearance. Genitourinary:      Vulva and urethral meatus normal.      Right Labia: No tenderness, lesions or skin changes. Left Labia: No tenderness, lesions or skin changes. Vaginal discharge present. Pulmonary:      Effort: Pulmonary effort is normal.   Psychiatric:         Speech: Speech normal.         Behavior: Behavior normal.   Vitals reviewed. Assessment & Plan:   Damien Elias was seen today for confirmation of pregnancy. Diagnoses and all orders for this visit:    Positive pregnancy test  -     C.trachomatis N.gonorrhoeae DNA; Future  -     CBC with Auto Differential; Future  -     Culture, Urine; Future  -     Hepatitis B Surface Antigen Obstetric Panel; Future  -     Hepatitis C Antibody; Future  -     HIV Screen; Future  -     PAP SMEAR; Future  -     Rubella antibody, IgG; Future  -     T. pallidum Ab; Future  -     Type and screen; Future  -     Urinalysis; Future  -     Urine Drug Screen; Future  -     Vaginitis DNA Probe; Future    Missed menses  -     C.trachomatis N.gonorrhoeae DNA; Future  -     CBC with Auto Differential; Future  -     Culture, Urine; Future  -     Hepatitis B Surface Antigen Obstetric Panel; Future  -     Hepatitis C Antibody; Future  -     HIV Screen; Future  -     PAP SMEAR; Future  -     Rubella antibody, IgG; Future  -     T. pallidum Ab; Future  -     Type and screen; Future  -     Urinalysis; Future  -     Urine Drug Screen; Future  -     Vaginitis DNA Probe;  Future     3/9/21 F APG  Wt 6#4    History of prior pregnancy with IUGR     H/O breast augmentation    8 weeks gestation of pregnancy    The patient was seen face to face and her full history was reviewed as well as physical

## 2023-10-06 LAB
C TRACH DNA SPEC QL PROBE+SIG AMP: NEGATIVE
N GONORRHOEA DNA SPEC QL PROBE+SIG AMP: NEGATIVE
SPECIMEN DESCRIPTION: NORMAL

## 2023-10-16 LAB — CYTOLOGY REPORT: NORMAL
